# Patient Record
Sex: MALE | Race: WHITE
[De-identification: names, ages, dates, MRNs, and addresses within clinical notes are randomized per-mention and may not be internally consistent; named-entity substitution may affect disease eponyms.]

---

## 2024-12-02 ENCOUNTER — APPOINTMENT (OUTPATIENT)
Dept: VASCULAR SURGERY | Facility: CLINIC | Age: 79
End: 2024-12-02
Payer: MEDICARE

## 2024-12-02 DIAGNOSIS — Z99.2 END STAGE RENAL DISEASE: ICD-10-CM

## 2024-12-02 DIAGNOSIS — N18.6 END STAGE RENAL DISEASE: ICD-10-CM

## 2024-12-02 PROCEDURE — 99204 OFFICE O/P NEW MOD 45 MIN: CPT

## 2024-12-02 PROCEDURE — 93986 DUP-SCAN HEMO COMPL UNI STD: CPT

## 2024-12-02 RX ORDER — EMPAGLIFLOZIN 10 MG/1
10 TABLET, FILM COATED ORAL
Refills: 0 | Status: ACTIVE | COMMUNITY

## 2024-12-02 RX ORDER — PANTOPRAZOLE 20 MG/1
20 TABLET, DELAYED RELEASE ORAL
Refills: 0 | Status: ACTIVE | COMMUNITY

## 2024-12-02 RX ORDER — EZETIMIBE 10 MG/1
10 TABLET ORAL
Refills: 0 | Status: ACTIVE | COMMUNITY

## 2024-12-02 RX ORDER — FOLIC ACID/VIT B COMPLEX AND C 0.8 MG
TABLET ORAL
Refills: 0 | Status: ACTIVE | COMMUNITY

## 2024-12-02 RX ORDER — SACUBITRIL AND VALSARTAN 24; 26 MG/1; MG/1
24-26 TABLET, FILM COATED ORAL
Refills: 0 | Status: ACTIVE | COMMUNITY

## 2024-12-02 RX ORDER — APIXABAN 5 MG/1
5 TABLET, FILM COATED ORAL
Refills: 0 | Status: ACTIVE | COMMUNITY

## 2024-12-02 RX ORDER — ATORVASTATIN CALCIUM 80 MG/1
80 TABLET, FILM COATED ORAL
Refills: 0 | Status: ACTIVE | COMMUNITY

## 2024-12-02 RX ORDER — CARVEDILOL 6.25 MG/1
6.25 TABLET, FILM COATED ORAL
Refills: 0 | Status: ACTIVE | COMMUNITY

## 2024-12-09 PROBLEM — I10 ESSENTIAL (PRIMARY) HYPERTENSION: Chronic | Status: ACTIVE | Noted: 2024-12-05

## 2024-12-09 PROBLEM — I25.10 ATHEROSCLEROTIC HEART DISEASE OF NATIVE CORONARY ARTERY WITHOUT ANGINA PECTORIS: Chronic | Status: ACTIVE | Noted: 2024-12-05

## 2024-12-09 PROBLEM — C61 MALIGNANT NEOPLASM OF PROSTATE: Chronic | Status: ACTIVE | Noted: 2024-12-05

## 2024-12-09 PROBLEM — E78.5 HYPERLIPIDEMIA, UNSPECIFIED: Chronic | Status: ACTIVE | Noted: 2024-12-05

## 2024-12-09 PROBLEM — I73.9 PERIPHERAL VASCULAR DISEASE, UNSPECIFIED: Chronic | Status: ACTIVE | Noted: 2024-12-05

## 2024-12-09 PROBLEM — Z87.438 PERSONAL HISTORY OF OTHER DISEASES OF MALE GENITAL ORGANS: Chronic | Status: ACTIVE | Noted: 2024-12-05

## 2024-12-09 PROBLEM — E11.9 TYPE 2 DIABETES MELLITUS WITHOUT COMPLICATIONS: Chronic | Status: ACTIVE | Noted: 2024-12-05

## 2024-12-09 PROBLEM — I50.22 CHRONIC SYSTOLIC (CONGESTIVE) HEART FAILURE: Chronic | Status: ACTIVE | Noted: 2024-12-05

## 2025-01-09 VITALS
SYSTOLIC BLOOD PRESSURE: 167 MMHG | WEIGHT: 185.85 LBS | OXYGEN SATURATION: 96 % | RESPIRATION RATE: 16 BRPM | DIASTOLIC BLOOD PRESSURE: 83 MMHG | HEART RATE: 96 BPM | TEMPERATURE: 98 F | HEIGHT: 78 IN

## 2025-01-09 NOTE — ASU PREOP CHECKLIST - 2.
pt stated that he fell last week,, has bruising on left eye and wound on left forehead pt stated that he fell last week,, has bruising on left eye and wound on left forehead,and has not had HD since saturday- Dr. Quesada aware. pt surgery cancelled and will be sent to the ER for admission

## 2025-01-09 NOTE — ASU PATIENT PROFILE, ADULT - NSICDXPASTMEDICALHX_GEN_ALL_CORE_FT
PAST MEDICAL HISTORY:  CAD (coronary artery disease)     Chronic HFrEF (heart failure with reduced ejection fraction) 12/2023 - 30%    DM (diabetes mellitus)     ESRD on dialysis T, Thurs, Sat- permacath right chest    History of blood clotting disorder Undiagnosed- pt denies    History of BPH     History of recent fall left eye bruised    HLD (hyperlipidemia)     HTN (hypertension)     PAD (peripheral artery disease)     Prostate cancer Denies

## 2025-01-09 NOTE — ASU PATIENT PROFILE, ADULT - NSICDXPASTSURGICALHX_GEN_ALL_CORE_FT
PAST SURGICAL HISTORY:  Aneurysm of left popliteal artery Repair    Cataract bilateral    H/O extremity bypass graft RLE and LLE    S/P CABG x 4 2007

## 2025-01-10 ENCOUNTER — INPATIENT (INPATIENT)
Facility: HOSPITAL | Age: 80
LOS: 1 days | Discharge: HOME | End: 2025-01-12
Attending: INTERNAL MEDICINE | Admitting: INTERNAL MEDICINE
Payer: MEDICARE

## 2025-01-10 ENCOUNTER — OUTPATIENT (OUTPATIENT)
Dept: INPATIENT UNIT | Facility: HOSPITAL | Age: 80
LOS: 1 days | End: 2025-01-10
Payer: MEDICARE

## 2025-01-10 VITALS
HEIGHT: 78 IN | DIASTOLIC BLOOD PRESSURE: 92 MMHG | HEART RATE: 95 BPM | OXYGEN SATURATION: 97 % | SYSTOLIC BLOOD PRESSURE: 131 MMHG | TEMPERATURE: 98 F | RESPIRATION RATE: 16 BRPM

## 2025-01-10 DIAGNOSIS — Z29.9 ENCOUNTER FOR PROPHYLACTIC MEASURES, UNSPECIFIED: ICD-10-CM

## 2025-01-10 DIAGNOSIS — I72.4 ANEURYSM OF ARTERY OF LOWER EXTREMITY: Chronic | ICD-10-CM

## 2025-01-10 DIAGNOSIS — I50.22 CHRONIC SYSTOLIC (CONGESTIVE) HEART FAILURE: ICD-10-CM

## 2025-01-10 DIAGNOSIS — I73.9 PERIPHERAL VASCULAR DISEASE, UNSPECIFIED: ICD-10-CM

## 2025-01-10 DIAGNOSIS — Z86.2 PERSONAL HISTORY OF DISEASES OF THE BLOOD AND BLOOD-FORMING ORGANS AND CERTAIN DISORDERS INVOLVING THE IMMUNE MECHANISM: ICD-10-CM

## 2025-01-10 DIAGNOSIS — E87.5 HYPERKALEMIA: ICD-10-CM

## 2025-01-10 DIAGNOSIS — Z95.1 PRESENCE OF AORTOCORONARY BYPASS GRAFT: Chronic | ICD-10-CM

## 2025-01-10 DIAGNOSIS — I25.10 ATHEROSCLEROTIC HEART DISEASE OF NATIVE CORONARY ARTERY WITHOUT ANGINA PECTORIS: ICD-10-CM

## 2025-01-10 DIAGNOSIS — Z91.81 HISTORY OF FALLING: ICD-10-CM

## 2025-01-10 DIAGNOSIS — H26.9 UNSPECIFIED CATARACT: Chronic | ICD-10-CM

## 2025-01-10 DIAGNOSIS — N18.6 END STAGE RENAL DISEASE: ICD-10-CM

## 2025-01-10 DIAGNOSIS — Z95.828 PRESENCE OF OTHER VASCULAR IMPLANTS AND GRAFTS: Chronic | ICD-10-CM

## 2025-01-10 DIAGNOSIS — I10 ESSENTIAL (PRIMARY) HYPERTENSION: ICD-10-CM

## 2025-01-10 DIAGNOSIS — Z87.438 PERSONAL HISTORY OF OTHER DISEASES OF MALE GENITAL ORGANS: ICD-10-CM

## 2025-01-10 DIAGNOSIS — E11.9 TYPE 2 DIABETES MELLITUS WITHOUT COMPLICATIONS: ICD-10-CM

## 2025-01-10 DIAGNOSIS — K83.8 OTHER SPECIFIED DISEASES OF BILIARY TRACT: ICD-10-CM

## 2025-01-10 LAB
ADD ON TEST-SPECIMEN IN LAB: SIGNIFICANT CHANGE UP
ADD ON TEST-SPECIMEN IN LAB: SIGNIFICANT CHANGE UP
ALBUMIN SERPL ELPH-MCNC: 3.9 G/DL — SIGNIFICANT CHANGE UP (ref 3.3–5)
ALP SERPL-CCNC: 108 U/L — SIGNIFICANT CHANGE UP (ref 40–120)
ALT FLD-CCNC: 58 U/L — HIGH (ref 10–45)
ANION GAP SERPL CALC-SCNC: 15 MMOL/L — SIGNIFICANT CHANGE UP (ref 5–17)
ANION GAP SERPL CALC-SCNC: 25 MMOL/L — HIGH (ref 5–17)
APTT BLD: 48.6 SEC — HIGH (ref 24.5–35.6)
AST SERPL-CCNC: 25 U/L — SIGNIFICANT CHANGE UP (ref 10–40)
BASOPHILS # BLD AUTO: 0.06 K/UL — SIGNIFICANT CHANGE UP (ref 0–0.2)
BASOPHILS NFR BLD AUTO: 0.6 % — SIGNIFICANT CHANGE UP (ref 0–2)
BILIRUB SERPL-MCNC: 0.3 MG/DL — SIGNIFICANT CHANGE UP (ref 0.2–1.2)
BUN SERPL-MCNC: 27 MG/DL — HIGH (ref 7–23)
BUN SERPL-MCNC: 97 MG/DL — HIGH (ref 7–23)
CALCIUM SERPL-MCNC: 8.2 MG/DL — LOW (ref 8.4–10.5)
CALCIUM SERPL-MCNC: 8.6 MG/DL — SIGNIFICANT CHANGE UP (ref 8.4–10.5)
CHLORIDE SERPL-SCNC: 100 MMOL/L — SIGNIFICANT CHANGE UP (ref 96–108)
CHLORIDE SERPL-SCNC: 96 MMOL/L — SIGNIFICANT CHANGE UP (ref 96–108)
CO2 SERPL-SCNC: 12 MMOL/L — LOW (ref 22–31)
CO2 SERPL-SCNC: 29 MMOL/L — SIGNIFICANT CHANGE UP (ref 22–31)
CREAT SERPL-MCNC: 11.78 MG/DL — HIGH (ref 0.5–1.3)
CREAT SERPL-MCNC: 3.68 MG/DL — HIGH (ref 0.5–1.3)
EGFR: 16 ML/MIN/1.73M2 — LOW
EGFR: 4 ML/MIN/1.73M2 — LOW
EOSINOPHIL # BLD AUTO: 0.17 K/UL — SIGNIFICANT CHANGE UP (ref 0–0.5)
EOSINOPHIL NFR BLD AUTO: 1.6 % — SIGNIFICANT CHANGE UP (ref 0–6)
FLUAV AG NPH QL: SIGNIFICANT CHANGE UP
FLUBV AG NPH QL: SIGNIFICANT CHANGE UP
GAS PNL BLDV: SIGNIFICANT CHANGE UP
GAS PNL BLDV: SIGNIFICANT CHANGE UP
GLUCOSE SERPL-MCNC: 78 MG/DL — SIGNIFICANT CHANGE UP (ref 70–99)
GLUCOSE SERPL-MCNC: 99 MG/DL — SIGNIFICANT CHANGE UP (ref 70–99)
HBV SURFACE AB SER-ACNC: REACTIVE — SIGNIFICANT CHANGE UP
HBV SURFACE AG SER-ACNC: SIGNIFICANT CHANGE UP
HCT VFR BLD CALC: 27 % — LOW (ref 39–50)
HCV AB S/CO SERPL IA: 0.04 S/CO — SIGNIFICANT CHANGE UP
HCV AB SERPL-IMP: SIGNIFICANT CHANGE UP
HGB BLD-MCNC: 8.2 G/DL — LOW (ref 13–17)
IMM GRANULOCYTES NFR BLD AUTO: 0.9 % — SIGNIFICANT CHANGE UP (ref 0–0.9)
INR BLD: 1.08 — SIGNIFICANT CHANGE UP (ref 0.85–1.16)
LACTATE SERPL-SCNC: 1 MMOL/L — SIGNIFICANT CHANGE UP (ref 0.5–2)
LYMPHOCYTES # BLD AUTO: 1.3 K/UL — SIGNIFICANT CHANGE UP (ref 1–3.3)
LYMPHOCYTES # BLD AUTO: 12.5 % — LOW (ref 13–44)
MAGNESIUM SERPL-MCNC: 1.6 MG/DL — SIGNIFICANT CHANGE UP (ref 1.6–2.6)
MCHC RBC-ENTMCNC: 29.2 PG — SIGNIFICANT CHANGE UP (ref 27–34)
MCHC RBC-ENTMCNC: 30.4 G/DL — LOW (ref 32–36)
MCV RBC AUTO: 96.1 FL — SIGNIFICANT CHANGE UP (ref 80–100)
MONOCYTES # BLD AUTO: 0.19 K/UL — SIGNIFICANT CHANGE UP (ref 0–0.9)
MONOCYTES NFR BLD AUTO: 1.8 % — LOW (ref 2–14)
NEUTROPHILS # BLD AUTO: 8.61 K/UL — HIGH (ref 1.8–7.4)
NEUTROPHILS NFR BLD AUTO: 82.6 % — HIGH (ref 43–77)
NRBC # BLD: 0 /100 WBCS — SIGNIFICANT CHANGE UP (ref 0–0)
PHOSPHATE SERPL-MCNC: 9.4 MG/DL — HIGH (ref 2.5–4.5)
PLATELET # BLD AUTO: 189 K/UL — SIGNIFICANT CHANGE UP (ref 150–400)
POTASSIUM SERPL-MCNC: 3.2 MMOL/L — LOW (ref 3.5–5.3)
POTASSIUM SERPL-MCNC: 6.3 MMOL/L — CRITICAL HIGH (ref 3.5–5.3)
POTASSIUM SERPL-SCNC: 3.2 MMOL/L — LOW (ref 3.5–5.3)
POTASSIUM SERPL-SCNC: 6.3 MMOL/L — CRITICAL HIGH (ref 3.5–5.3)
PROT SERPL-MCNC: 7.2 G/DL — SIGNIFICANT CHANGE UP (ref 6–8.3)
PROTHROM AB SERPL-ACNC: 12.6 SEC — SIGNIFICANT CHANGE UP (ref 9.9–13.4)
RBC # BLD: 2.81 M/UL — LOW (ref 4.2–5.8)
RBC # FLD: 16.7 % — HIGH (ref 10.3–14.5)
RSV RNA NPH QL NAA+NON-PROBE: SIGNIFICANT CHANGE UP
SARS-COV-2 RNA SPEC QL NAA+PROBE: SIGNIFICANT CHANGE UP
SODIUM SERPL-SCNC: 137 MMOL/L — SIGNIFICANT CHANGE UP (ref 135–145)
SODIUM SERPL-SCNC: 140 MMOL/L — SIGNIFICANT CHANGE UP (ref 135–145)
WBC # BLD: 10.42 K/UL — SIGNIFICANT CHANGE UP (ref 3.8–10.5)
WBC # FLD AUTO: 10.42 K/UL — SIGNIFICANT CHANGE UP (ref 3.8–10.5)

## 2025-01-10 PROCEDURE — 70450 CT HEAD/BRAIN W/O DYE: CPT | Mod: 26

## 2025-01-10 PROCEDURE — 36415 COLL VENOUS BLD VENIPUNCTURE: CPT

## 2025-01-10 PROCEDURE — 99291 CRITICAL CARE FIRST HOUR: CPT

## 2025-01-10 PROCEDURE — 84295 ASSAY OF SERUM SODIUM: CPT

## 2025-01-10 PROCEDURE — 85610 PROTHROMBIN TIME: CPT

## 2025-01-10 PROCEDURE — 85730 THROMBOPLASTIN TIME PARTIAL: CPT

## 2025-01-10 PROCEDURE — 82330 ASSAY OF CALCIUM: CPT

## 2025-01-10 PROCEDURE — 93010 ELECTROCARDIOGRAM REPORT: CPT

## 2025-01-10 PROCEDURE — 84132 ASSAY OF SERUM POTASSIUM: CPT

## 2025-01-10 PROCEDURE — 82947 ASSAY GLUCOSE BLOOD QUANT: CPT

## 2025-01-10 PROCEDURE — 71045 X-RAY EXAM CHEST 1 VIEW: CPT | Mod: 26,XE

## 2025-01-10 PROCEDURE — 82962 GLUCOSE BLOOD TEST: CPT

## 2025-01-10 PROCEDURE — 80053 COMPREHEN METABOLIC PANEL: CPT

## 2025-01-10 PROCEDURE — 85014 HEMATOCRIT: CPT

## 2025-01-10 PROCEDURE — 99223 1ST HOSP IP/OBS HIGH 75: CPT | Mod: GC

## 2025-01-10 PROCEDURE — 71046 X-RAY EXAM CHEST 2 VIEWS: CPT | Mod: 26

## 2025-01-10 PROCEDURE — 82803 BLOOD GASES ANY COMBINATION: CPT

## 2025-01-10 RX ORDER — CEFTRIAXONE SODIUM 1 G/1
1000 INJECTION, POWDER, FOR SOLUTION INTRAMUSCULAR; INTRAVENOUS ONCE
Refills: 0 | Status: COMPLETED | OUTPATIENT
Start: 2025-01-10 | End: 2025-01-10

## 2025-01-10 RX ORDER — PANTOPRAZOLE 40 MG/1
40 TABLET, DELAYED RELEASE ORAL
Refills: 0 | Status: DISCONTINUED | OUTPATIENT
Start: 2025-01-10 | End: 2025-01-10

## 2025-01-10 RX ORDER — CHLORHEXIDINE GLUCONATE 1.2 MG/ML
1 RINSE ORAL ONCE
Refills: 0 | Status: COMPLETED | OUTPATIENT
Start: 2025-01-10 | End: 2025-01-10

## 2025-01-10 RX ORDER — ATORVASTATIN CALCIUM 40 MG/1
80 TABLET, FILM COATED ORAL AT BEDTIME
Refills: 0 | Status: DISCONTINUED | OUTPATIENT
Start: 2025-01-10 | End: 2025-01-12

## 2025-01-10 RX ORDER — CALCIUM GLUCONATE 94 MG/ML
1 INJECTION, SOLUTION INTRAVENOUS ONCE
Refills: 0 | Status: COMPLETED | OUTPATIENT
Start: 2025-01-10 | End: 2025-01-10

## 2025-01-10 RX ORDER — ASPIRIN 81 MG
81 TABLET, DELAYED RELEASE (ENTERIC COATED) ORAL DAILY
Refills: 0 | Status: DISCONTINUED | OUTPATIENT
Start: 2025-01-10 | End: 2025-01-10

## 2025-01-10 RX ORDER — APIXABAN 5 MG/1
5 TABLET, FILM COATED ORAL EVERY 12 HOURS
Refills: 0 | Status: DISCONTINUED | OUTPATIENT
Start: 2025-01-10 | End: 2025-01-12

## 2025-01-10 RX ORDER — PANTOPRAZOLE 40 MG/1
1 TABLET, DELAYED RELEASE ORAL
Refills: 0 | DISCHARGE

## 2025-01-10 RX ORDER — APIXABAN 5 MG/1
5 TABLET, FILM COATED ORAL ONCE
Refills: 0 | Status: COMPLETED | OUTPATIENT
Start: 2025-01-10 | End: 2025-01-10

## 2025-01-10 RX ORDER — AZITHROMYCIN MONOHYDRATE 200 MG/5ML
500 POWDER, FOR SUSPENSION ORAL ONCE
Refills: 0 | Status: COMPLETED | OUTPATIENT
Start: 2025-01-10 | End: 2025-01-10

## 2025-01-10 RX ORDER — AZITHROMYCIN MONOHYDRATE 200 MG/5ML
500 POWDER, FOR SUSPENSION ORAL EVERY 24 HOURS
Refills: 0 | Status: DISCONTINUED | OUTPATIENT
Start: 2025-01-11 | End: 2025-01-12

## 2025-01-10 RX ORDER — PANTOPRAZOLE 40 MG/1
20 TABLET, DELAYED RELEASE ORAL
Refills: 0 | Status: DISCONTINUED | OUTPATIENT
Start: 2025-01-10 | End: 2025-01-12

## 2025-01-10 RX ORDER — CEFTRIAXONE SODIUM 1 G/1
1000 INJECTION, POWDER, FOR SOLUTION INTRAMUSCULAR; INTRAVENOUS EVERY 24 HOURS
Refills: 0 | Status: DISCONTINUED | OUTPATIENT
Start: 2025-01-11 | End: 2025-01-12

## 2025-01-10 RX ORDER — SODIUM ZIRCONIUM CYCLOSILICATE 10 G/10G
10 POWDER, FOR SUSPENSION ORAL ONCE
Refills: 0 | Status: COMPLETED | OUTPATIENT
Start: 2025-01-10 | End: 2025-01-10

## 2025-01-10 RX ORDER — EPOETIN ALFA 2000 [IU]/ML
8000 SOLUTION INTRAVENOUS; SUBCUTANEOUS ONCE
Refills: 0 | Status: COMPLETED | OUTPATIENT
Start: 2025-01-10 | End: 2025-01-10

## 2025-01-10 RX ORDER — INSULIN HUMAN 100 [IU]/ML
5 INJECTION, SOLUTION SUBCUTANEOUS ONCE
Refills: 0 | Status: COMPLETED | OUTPATIENT
Start: 2025-01-10 | End: 2025-01-10

## 2025-01-10 RX ORDER — ALBUTEROL SULFATE 90 UG/1
2.5 INHALANT RESPIRATORY (INHALATION) ONCE
Refills: 0 | Status: COMPLETED | OUTPATIENT
Start: 2025-01-10 | End: 2025-01-10

## 2025-01-10 RX ORDER — EPOETIN ALFA 2000 [IU]/ML
8000 SOLUTION INTRAVENOUS; SUBCUTANEOUS ONCE
Refills: 0 | Status: DISCONTINUED | OUTPATIENT
Start: 2025-01-10 | End: 2025-01-10

## 2025-01-10 RX ORDER — ASPIRIN 81 MG
81 TABLET, DELAYED RELEASE (ENTERIC COATED) ORAL DAILY
Refills: 0 | Status: DISCONTINUED | OUTPATIENT
Start: 2025-01-10 | End: 2025-01-12

## 2025-01-10 RX ORDER — EZETIMIBE 10 MG/1
10 TABLET ORAL DAILY
Refills: 0 | Status: DISCONTINUED | OUTPATIENT
Start: 2025-01-10 | End: 2025-01-10

## 2025-01-10 RX ORDER — DEXTROSE MONOHYDRATE 25 G/50ML
50 INJECTION, SOLUTION INTRAVENOUS ONCE
Refills: 0 | Status: COMPLETED | OUTPATIENT
Start: 2025-01-10 | End: 2025-01-10

## 2025-01-10 RX ORDER — ATORVASTATIN CALCIUM 40 MG/1
80 TABLET, FILM COATED ORAL AT BEDTIME
Refills: 0 | Status: DISCONTINUED | OUTPATIENT
Start: 2025-01-10 | End: 2025-01-10

## 2025-01-10 RX ORDER — CARVEDILOL 25 MG/1
6.25 TABLET, FILM COATED ORAL EVERY 12 HOURS
Refills: 0 | Status: DISCONTINUED | OUTPATIENT
Start: 2025-01-10 | End: 2025-01-10

## 2025-01-10 RX ADMIN — Medication 81 MILLIGRAM(S): at 19:19

## 2025-01-10 RX ADMIN — ALBUTEROL SULFATE 2.5 MILLIGRAM(S): 90 INHALANT RESPIRATORY (INHALATION) at 09:30

## 2025-01-10 RX ADMIN — CEFTRIAXONE SODIUM 100 MILLIGRAM(S): 1 INJECTION, POWDER, FOR SOLUTION INTRAMUSCULAR; INTRAVENOUS at 09:54

## 2025-01-10 RX ADMIN — CALCIUM GLUCONATE 100 GRAM(S): 94 INJECTION, SOLUTION INTRAVENOUS at 09:30

## 2025-01-10 RX ADMIN — INSULIN HUMAN 5 UNIT(S): 100 INJECTION, SOLUTION SUBCUTANEOUS at 09:30

## 2025-01-10 RX ADMIN — DEXTROSE MONOHYDRATE 50 MILLILITER(S): 25 INJECTION, SOLUTION INTRAVENOUS at 09:30

## 2025-01-10 RX ADMIN — ATORVASTATIN CALCIUM 80 MILLIGRAM(S): 40 TABLET, FILM COATED ORAL at 21:47

## 2025-01-10 RX ADMIN — APIXABAN 5 MILLIGRAM(S): 5 TABLET, FILM COATED ORAL at 19:19

## 2025-01-10 RX ADMIN — SODIUM ZIRCONIUM CYCLOSILICATE 10 GRAM(S): 10 POWDER, FOR SUSPENSION ORAL at 07:44

## 2025-01-10 RX ADMIN — EPOETIN ALFA 8000 UNIT(S): 2000 SOLUTION INTRAVENOUS; SUBCUTANEOUS at 16:10

## 2025-01-10 RX ADMIN — AZITHROMYCIN MONOHYDRATE 255 MILLIGRAM(S): 200 POWDER, FOR SUSPENSION ORAL at 09:55

## 2025-01-10 RX ADMIN — APIXABAN 5 MILLIGRAM(S): 5 TABLET, FILM COATED ORAL at 10:46

## 2025-01-10 NOTE — H&P ADULT - PROBLEM SELECTOR PLAN 2
Patient with dry cough and sob. Found to have Right lower lobe consolidation. S/p CTX and Azithromycin in ED.  - c/w CTX and Azithromycin  - urine step and legionella Patient with dry cough and sob. Found to have Right lower lobe consolidation. S/p CTX and Azithromycin in ED.  - c/w CTX and Azithromycin  - urine step and legionella  - f/u full RVP

## 2025-01-10 NOTE — CONSULT NOTE ADULT - SUBJECTIVE AND OBJECTIVE BOX
NEPHROLOGY SERVICE INITIAL CONSULT NOTE    HPI: 78 y/o M  w/ ESRD TTS scheduled via Right chest TDC, Missed  dialysis x2, last dialyzed 25).  Missed session because of fall coming off the bus and sustained head injury, no LOC, h/o HTN,  CAD s/p CABG() with multiple PCI, PAD, came e    	Mr. Oliverio Frausto is a 79M (right hand dominant) w/ HTN, HLD, CAD s/p CABG () and multiple PCI (), CHF (EF 30%) and PAD s/p L popliteal aneurysm repair c/b compartment syndrome s/p fasciotomy and subsequent LLE bypass (3/2019), also prior RLE bypass, BPH, possible hypercoagulable disorder, prior hospitalization in Fall  for pneumonia (since recovered), and ESRD on HD s/p RIJ permcath who was scheduled for LUE AVF/AVG creation today 1/10/25. However he had a fall last Saturday and has subsequently missed dialysis this past week. Potassium 6.7 on iSTAT this morning. has some tachypnea. Has some ecchymosis on his face after recent fall.     Will cancel today's case and send him to the ED for medical admission. Consult renal for dialysis today. CXR. Obtain CTH for recent fall. If no issues, restart Eliquis today. Likely would DC after dialysis today (or if needed tomorrow)    Will tentatively reschedule him for outpatient LUE AVF/AVG creation next 25. Instructed him to resume Eliquis and take last dose night of 25. Will try to arrange with his outpatient dialysis center for dialysis on 25 in preparation for Tuesday's procedure. If too difficult to coordinate will reschedule for another day.        REVIEW OF SYSTEMS: Otherwise negative except as specified in HPI  PAST MEDICAL & SURGICAL HISTORY:  HTN (hypertension)      HLD (hyperlipidemia)      CAD (coronary artery disease)    1/  DM (diabetes mellitus)      Chronic HFrEF (heart failure with reduced ejection fraction)  2023 - 30%      PAD (peripheral artery disease)      History of BPH      ESRD on dialysis  T, Thurs, Sat- permacath right chest      History of blood clotting disorder  Undiagnosed- pt denies      Prostate cancer  Denies      History of recent fall  left eye bruised      S/P CABG x 4        Aneurysm of left popliteal artery  Repair      H/O extremity bypass graft  RLE and LLE      Cataract  bilateral        FAMILY HISTORY:    SOCIAL HISTORY:  HOME MEDICATIONS:    Allergies    No Known Allergies    Intolerances        ACTIVE MEDICATIONS:  MEDICATIONS  (STANDING):  aspirin enteric coated 81 milliGRAM(s) Oral daily  atorvastatin 80 milliGRAM(s) Oral at bedtime  carvedilol 6.25 milliGRAM(s) Oral every 12 hours  ezetimibe 10 milliGRAM(s) Oral daily  pantoprazole    Tablet 40 milliGRAM(s) Oral before breakfast    MEDICATIONS  (PRN):        VITAL SIGNS:  Vital Signs Last 24 Hrs  T(C): 36.6 (10 Mir 2025 07:59), Max: 36.8 (2025 11:43)  T(F): 97.9 (10 Mir 2025 07:59), Max: 98.3 (2025 11:43)  HR: 95 (10 Mir 2025 07:59) (95 - 96)  BP: 131/92 (10 Mir 2025 07:59) (131/92 - 167/83)  BP(mean): --  RR: 16 (10 Mir 2025 07:59) (16 - 16)  SpO2: 97% (10 Mir 2025 07:59) (96% - 97%)          PE:  General: Not in acute distress, well-nourished  Neck: No visible mass, No JVD noted  Chest: CTAP b/l, no use of accessory respiratory muscles  Heart: RRR, S1/S2 wnl, no MRG  Abdomen: Soft, nontender, nondistended,  no hepatosplenomegaly  Extremities: No clubbing, cyanosis or edema  Neuro:  Alert, no apparent focal deficits, answer questions appropriately  Access:    Pertinent labs & Imagin.2    10.42 )-----------( 189      ( 10 Mir 2025 08:22 )             27.0     01-10    x   |  x   |  x   ----------------------------<  78  x    |  x   |  x     Ca    8.6      10 Mir 2025 06:59  Mg     1.6     01-10    TPro  7.9  /  Alb  4.3  /  TBili  0.4  /  DBili  x   /  AST  28  /  ALT  62[H]  /  AlkPhos  122[H]  01-10    PT/INR - ( 10 Mir 2025 08:24 )   PT: 12.6 sec;   INR: 1.08          PTT - ( 10 Mir 2025 08:24 )  PTT:48.6 sec  Urinalysis Basic - ( 10 Mir 2025 08:22 )    Color: x / Appearance: x / SG: x / pH: x  Gluc: 78 mg/dL / Ketone: x  / Bili: x / Urobili: x   Blood: x / Protein: x / Nitrite: x   Leuk Esterase: x / RBC: x / WBC x   Sq Epi: x / Non Sq Epi: x / Bacteria: x          CAPILLARY BLOOD GLUCOSE      POCT Blood Glucose.: 80 mg/dL (10 Mir 2025 06:48)          RADIOLOGY & ADDITIONAL TESTS: Reviewed. NEPHROLOGY SERVICE INITIAL CONSULT NOTE    HPI: 80 y/o M  w/ ESRD TTS scheduled via Right chest TDC, Missed  dialysis x2, last dialyzed 25).  Missed session because of fall coming off the bus and sustained head injury, no LOC, h/o HTN, HFrEF (30%) CAD s/p CABG() with multiple PCI, PAD w/ multiple interventions on eliquis,  here for AVF creation, found to be hyperkalemic K 6.5, given lokelma 10g , no EKG changes, nephrology consulted for HD management    REVIEW OF SYSTEMS: Otherwise negative except as specified in HPI  PAST MEDICAL & SURGICAL HISTORY:  HTN (hypertension)      HLD (hyperlipidemia)      CAD (coronary artery disease)    1/  DM (diabetes mellitus)      Chronic HFrEF (heart failure with reduced ejection fraction)  2023 - 30%      PAD (peripheral artery disease)      History of BPH      ESRD on dialysis  T, Thurs, Sat- permacath right chest      History of blood clotting disorder  Undiagnosed- pt denies      Prostate cancer  Denies      History of recent fall  left eye bruised      S/P CABG x 4        Aneurysm of left popliteal artery  Repair      H/O extremity bypass graft  RLE and LLE      Cataract  bilateral        FAMILY HISTORY:    SOCIAL HISTORY:  HOME MEDICATIONS:    Allergies    No Known Allergies    Intolerances        ACTIVE MEDICATIONS:  MEDICATIONS  (STANDING):  aspirin enteric coated 81 milliGRAM(s) Oral daily  atorvastatin 80 milliGRAM(s) Oral at bedtime  carvedilol 6.25 milliGRAM(s) Oral every 12 hours  ezetimibe 10 milliGRAM(s) Oral daily  pantoprazole    Tablet 40 milliGRAM(s) Oral before breakfast    MEDICATIONS  (PRN):        VITAL SIGNS:  Vital Signs Last 24 Hrs  T(C): 36.6 (10 Mir 2025 07:59), Max: 36.8 (2025 11:43)  T(F): 97.9 (10 Mir 2025 07:59), Max: 98.3 (2025 11:43)  HR: 95 (10 Mir 2025 07:59) (95 - 96)  BP: 131/92 (10 Mir 2025 07:59) (131/92 - 167/83)  BP(mean): --  RR: 16 (10 Mir 2025 07:59) (16 - 16)  SpO2: 97% (10 Mir 2025 07:59) (96% - 97%)          PE:  General: Not in acute distress, well-nourished  Neck: No visible mass, No JVD noted  Chest: CTAP b/l, no use of accessory respiratory muscles  Heart: RRR, S1/S2 wnl, no MRG  Abdomen: Soft, nontender, nondistended,  no hepatosplenomegaly  Extremities: No clubbing, cyanosis or edema  Neuro:  Alert, no apparent focal deficits, answer questions appropriately  Access:    Pertinent labs & Imagin.2    10.42 )-----------( 189      ( 10 Mir 2025 08:22 )             27.0     01-10    x   |  x   |  x   ----------------------------<  78  x    |  x   |  x     Ca    8.6      10 Mir 2025 06:59  Mg     1.6     01-10    TPro  7.9  /  Alb  4.3  /  TBili  0.4  /  DBili  x   /  AST  28  /  ALT  62[H]  /  AlkPhos  122[H]  01-10    PT/INR - ( 10 Mir 2025 08:24 )   PT: 12.6 sec;   INR: 1.08          PTT - ( 10 Mir 2025 08:24 )  PTT:48.6 sec  Urinalysis Basic - ( 10 Mir 2025 08:22 )    Color: x / Appearance: x / SG: x / pH: x  Gluc: 78 mg/dL / Ketone: x  / Bili: x / Urobili: x   Blood: x / Protein: x / Nitrite: x   Leuk Esterase: x / RBC: x / WBC x   Sq Epi: x / Non Sq Epi: x / Bacteria: x          CAPILLARY BLOOD GLUCOSE      POCT Blood Glucose.: 80 mg/dL (10 Mir 2025 06:48)          RADIOLOGY & ADDITIONAL TESTS: Reviewed. NEPHROLOGY SERVICE INITIAL CONSULT NOTE    HPI: 80 y/o M  w/ ESRD TTS scheduled via Right chest TDC, Missed  dialysis x2, last dialyzed 25).  Missed session because of a fall coming off the bus and sustained head injury, no LOC, h/o HTN, HFrEF (30%) CAD s/p CABG() with multiple PCI, PAD w/ multiple interventions on eliquis,  here for AVF creation, found to be hyperkalemic K 6.5, given lokelma 10g , no EKG changes procedure postponed, nephrology consulted for HD management.    REVIEW OF SYSTEMS: Otherwise negative except as specified in HPI  PAST MEDICAL & SURGICAL HISTORY:  HTN (hypertension)      HLD (hyperlipidemia)      CAD (coronary artery disease)    1/  DM (diabetes mellitus)      Chronic HFrEF (heart failure with reduced ejection fraction)  2023 - 30%      PAD (peripheral artery disease)      History of BPH      ESRD on dialysis  T, Thurs, Sat- permacath right chest      History of blood clotting disorder  Undiagnosed- pt denies      Prostate cancer  Denies      History of recent fall  left eye bruised      S/P CABG x 4        Aneurysm of left popliteal artery  Repair      H/O extremity bypass graft  RLE and LLE      Cataract  bilateral        FAMILY HISTORY:    SOCIAL HISTORY:  HOME MEDICATIONS:    Allergies    No Known Allergies    Intolerances        ACTIVE MEDICATIONS:  MEDICATIONS  (STANDING):  aspirin enteric coated 81 milliGRAM(s) Oral daily  atorvastatin 80 milliGRAM(s) Oral at bedtime  carvedilol 6.25 milliGRAM(s) Oral every 12 hours  ezetimibe 10 milliGRAM(s) Oral daily  pantoprazole    Tablet 40 milliGRAM(s) Oral before breakfast    MEDICATIONS  (PRN):        VITAL SIGNS:  Vital Signs Last 24 Hrs  T(C): 36.6 (10 Mir 2025 07:59), Max: 36.8 (2025 11:43)  T(F): 97.9 (10 Mir 2025 07:59), Max: 98.3 (2025 11:43)  HR: 95 (10 Mir 2025 07:59) (95 - 96)  BP: 131/92 (10 Mir 2025 07:59) (131/92 - 167/83)  BP(mean): --  RR: 16 (10 Mir 2025 07:59) (16 - 16)  SpO2: 97% (10 Mir 2025 07:59) (96% - 97%)          PE:  General: mild respiratory distress , well-nourished  Neck: No visible mass, No JVD noted  Chest: CTAP b/l, no use of accessory respiratory muscles  Heart: RRR, S1/S2 wnl, no MRG  Abdomen: Soft, nontender, nondistended,  no hepatosplenomegaly  Extremities: No clubbing, cyanosis or edema  Neuro:  Alert, no apparent focal deficits, answer questions appropriately  Access: Right chest TDSC c/d/i    Pertinent labs & Imagin.2    10.42 )-----------( 189      ( 10 Mir 2025 08:22 )             27.0     01-10    x   |  x   |  x   ----------------------------<  78  x    |  x   |  x     Ca    8.6      10 Mir 2025 06:59  Mg     1.6     01-10    TPro  7.9  /  Alb  4.3  /  TBili  0.4  /  DBili  x   /  AST  28  /  ALT  62[H]  /  AlkPhos  122[H]  01-10    PT/INR - ( 10 Mir 2025 08:24 )   PT: 12.6 sec;   INR: 1.08          PTT - ( 10 Mir 2025 08:24 )  PTT:48.6 sec  Urinalysis Basic - ( 10 Mir 2025 08:22 )    Color: x / Appearance: x / SG: x / pH: x  Gluc: 78 mg/dL / Ketone: x  / Bili: x / Urobili: x   Blood: x / Protein: x / Nitrite: x   Leuk Esterase: x / RBC: x / WBC x   Sq Epi: x / Non Sq Epi: x / Bacteria: x          CAPILLARY BLOOD GLUCOSE      POCT Blood Glucose.: 80 mg/dL (10 Mir 2025 06:48)          RADIOLOGY & ADDITIONAL TESTS: Reviewed.

## 2025-01-10 NOTE — ED PROVIDER NOTE - CADM POA PRESS ULCER
[TextEntry] : PHYSICAL EXAM   General: The patient was alert, oriented and in no distress. Voice was clear.   Face: The patient had no facial asymmetry or mass. The skin was unremarkable.   Ears: Hearing normal to conversational voice External ears were normal without deformity. Ear canals : scant cerumen was removed atraumatically with a curette under the microscope. dry skin. no inflammation Tympanic membranes were intact and normal. No perforation or effusion. mobile   Nose: The external nose had no significant deformity.  There was no facial tenderness.  On anterior rhinoscopy, the nasal mucosa was normal.  The anterior septum was grossly midline. There were no visualized polyps, purulence or masses.   Oral cavity: Oral mucosa- normal. Oral and base of tongue- clear and without mass. Gingival and buccal mucosa- moist and without lesions. Palate- the palate moved well. There was no cleft palate. There appeared to be good salivary flow.  Oral cavity/oropharynx- no pus, erythema or mass   Neck: The neck was symmetrical. The parotid and submandibular glands were normal without masses. The trachea was midline and there was no unusual crepitus. Thyroid was smooth and nontender and no masses were palpated. No masses   Lymphatics: Cervical adenopathy- none.  No

## 2025-01-10 NOTE — CONSULT NOTE ADULT - ASSESSMENT
78 y/o M  w/ ESRD TTS scheduled via Right chest TDC, Missed  dialysis x2, last dialyzed Saturday 1/4/25).  Missed session because of fall coming off the bus and sustained head injury, no LOC, h/o HTN, HFrEF (30%) CAD s/p CABG(2007) with multiple PCI, PAD w/ multiple interventions on eliquis,  here for AVF creation, found to be hyperkalemic K 6.5, given lokelma 10g , no EKG changes, nephrology consulted for HD management    80 y/o M  w/ ESRD TTS scheduled via Right chest TDC, Missed  dialysis x2, last). , h/o HTN, HFrEF (30%) CAD s/p CABG() with multiple PCI, PAD w/ multiple interventions on eliquis,  here for AVF creation, found to be hyperkalemic K 6.5, given lokelma 10g , no EKG changes, nephrology consulted for HD management.    ESRD with hyperkalemia due to missed HD    -For HD with below parameters  Hemodialysis Treatment.:     Schedule: Once, Modality: Hemodialysis, Access: Internal Jugular Central Venous Catheter    Dialyzer: Optiflux [id number], Time: 210 Min    Blood Flow: 400 mL/Min , Dialysate Flow: 500 mL/Min, Dialysate Temp: 36.5, Tubinmm (Adult)    Target Fluid Removal: 2 Liters    Dialysate Electrolytes (mEq/L): Potassium 2, Calcium 2.5, Sodium 138, Bicarbonate 35 (01-10-25 @ 09:04) [Active]  -Renal diet  -Daily weight to help determine EDW    HTN/Volume status: slightly volume up  -UF with dialysis  -Hold BP meds before each dialysis session, can resume after HD    CKD-Anemia  -For iron studies, ferritin level  -Will adinister HODAN with each HD session    CKD-MBS: Hyperphosphatemia and hypocalcemia , likely secondary hyperparathyroidism4  Check iPTH level  sevelamer 800 mg tid  Will follow

## 2025-01-10 NOTE — H&P ADULT - PROBLEM SELECTOR PLAN 5
PMHx CAD s/p CABG x4 (2007), PCI to OM1 2/22. Home med: aspirin, atorvastatin 80mg, ezetimibe.  - c/w home meds ESRD on dialysis TThSat. Has R chest permacath, planning for L AV fistula.   - Urgent HD today for hyperkalemia  - Plan for dialysis tomorrow   - outpatient rescheduling with vascular surgery for AVF of L arm   - left arm limb precautions  - f/u nephro recs

## 2025-01-10 NOTE — H&P ADULT - ASSESSMENT
80 yo M with PMHx CAD s/p CABG x4 (2007), PCI to OM1 2/22, HFrEF (12/23 EF 30%), PAD s/p multiple peripheral interventions with L pop aneurysm repair c/b compartment syndrome with fasciotomy and subsequent LLE bypass (3/19), RLE bypass (4/20), likely undiagnosed clotting disorder on eliquis, ESRD on HD TTS via perm cath R chest, HTN, DM, BPH who presents for hyperkalemia during preop for elective L AVF placement with vascular surgery,  admitted to medicine for further management.

## 2025-01-10 NOTE — H&P ADULT - HISTORY OF PRESENT ILLNESS
80 yo M with PMHx CAD s/p CABG x4 (2007), PCI to OM1 2/22, HFrEF (12/23 EF 30%), PAD s/p multiple peripheral interventions with L pop aneurysm repair c/b compartment syndrome with fasciotomy and subsequent LLE bypass (3/19), RLE bypass (4/20), likely undiagnosed clotting disorder on eliquis, ESRD on HD TTS via perm cath R chest, HTN, DM, BPH who presents for hyperkalemia during preop for elective L AVF placement with vascular surgery. Patient reports missing dialysis 2x this past week due to the cold weather and is reliant on public transportaion. His last HD session was last Saturday. He also rports mechanical fall on Saturday after HD while getting off bus andh hit his head. Also reports cough and SOB 78 yo M with PMHx CAD s/p CABG x4 (2007), PCI to OM1 2/22, HFrEF (12/23 EF 30%), PAD s/p multiple peripheral interventions with L pop aneurysm repair c/b compartment syndrome with fasciotomy and subsequent LLE bypass (3/19), RLE bypass (4/20), likely undiagnosed clotting disorder on eliquis, ESRD on HD TTS via perm cath R chest, HTN, DM, BPH who presents for hyperkalemia during preop for elective L AVF placement with vascular surgery. Patient reports missing dialysis 2x this past week due to the cold weather and is reliant on public transportation. His last HD session was last Saturday. He also reports mechanical fall on Saturday after HD while getting off bus and hit his head. He reports having a dry cough and shortness of breath over the past week. Denies fever, chills, and abd pain.  80 yo M with PMHx CAD s/p CABG x4 (2007), PCI to OM1 2/22, HFrEF (12/23 EF 30%), PAD s/p multiple peripheral interventions with L pop aneurysm repair c/b compartment syndrome with fasciotomy and subsequent LLE bypass (3/19), RLE bypass (4/20), likely undiagnosed clotting disorder on Eliquis ESRD on HD TTS via perm cath R chest, HTN, DM, BPH who presents for hyperkalemia during preop for elective L AVF placement with vascular surgery. Patient reports missing dialysis 2x this past week due to the cold weather and is reliant on public transportation. His last HD session was last Saturday. He also reports mechanical fall on Saturday after HD while getting off bus and hit his head. He reports having a dry cough and shortness of breath over the past week. Denies fever, chills, and abd pain.     In the ED:  Initial vital signs: /92, HR 95, Temp 97.9 degrees F, Sp02 97% on room air   CT Head: No evidence of acute intracranial pathology.   80 yo M with PMHx CAD s/p CABG x4 (2007), PCI to OM1 2/22, HFrEF (12/23 EF 30%), PAD s/p multiple peripheral interventions with L pop aneurysm repair c/b compartment syndrome with fasciotomy and subsequent LLE bypass (3/19), RLE bypass (4/20), likely undiagnosed clotting disorder on Eliquis ESRD on HD TTS via perm cath R chest, HTN, DM, BPH who presents for hyperkalemia during preop for elective L AVF placement with vascular surgery. Patient reports missing dialysis 2x this past week due to the cold weather and is reliant on public transportation. His last HD session was last Saturday. He also reports mechanical fall on Saturday after HD while getting off bus and hit his head. He reports having a dry cough and shortness of breath over the past week. Denies fever, chills, and abd pain.     In the ED:  Initial vital signs: /92, HR 95, Temp 97.9 degrees F, Sp02 97% on room air   Labs significant for: Hgb 8.2, K 6.3, CO2 12, Anion gap 25, BUN/Cr 97/11.78, Calcium 8.2, ASTALT 25/58, Phos 9.4  CT Head: No evidence of acute intracranial pathology.  Interventions: Eliquis 5mg x1, Azithromycin 500mg x1, Calcium Gluconate 1g x1, CTX 1g x1, Insulin 5units IVP x1 78 yo M with PMHx CAD s/p CABG x4 (2007), PCI to OM1 2/22, HFrEF (12/23 EF 30%), PAD s/p multiple peripheral interventions with L pop aneurysm repair c/b compartment syndrome with fasciotomy and subsequent LLE bypass (3/19), RLE bypass (4/20), likely undiagnosed clotting disorder on Eliquis ESRD on HD TTS via perm cath R chest, HTN, DM, BPH who presents for hyperkalemia during preop for elective L AVF placement with vascular surgery. Patient reports missing dialysis 2x this past week due to the cold weather and is reliant on public transportation. His last HD session was last Saturday. He also reports mechanical fall on Saturday after HD while getting off bus and hit his head. He reports having a dry cough and shortness of breath over the past week. Denies fever, chills, and abd pain.     In the ED:  Initial vital signs: /92, HR 95, Temp 97.9 degrees F, Sp02 97% on room air   EKG: RBBB unchanged from prior  Labs significant for: Hgb 8.2, K 6.3, CO2 12, Anion gap 25, BUN/Cr 97/11.78, Calcium 8.2, ASTALT 25/58, Phos 9.4  CT Head: No evidence of acute intracranial pathology.  Interventions: Eliquis 5mg x1, Azithromycin 500mg x1, Calcium Gluconate 1g x1, CTX 1g x1, Insulin 5units IVP x1

## 2025-01-10 NOTE — H&P ADULT - PROBLEM SELECTOR PLAN 6
PAD s/p multiple peripheral interventions with L pop aneurysm repair c/b compartment syndrome with fasciotomy and subsequent LLE bypass (3/19), RLE bypass (4/20)  - c/w home aspirin PMHx CAD s/p CABG x4 (2007), PCI to OM1 2/22. Home med: aspirin, atorvastatin 80mg, ezetimibe.  - c/w home meds

## 2025-01-10 NOTE — CHART NOTE - NSCHARTNOTEFT_GEN_A_CORE
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    Patient apparently suffered a fall this week and missed dialysis 2x on Tuesday, Thursday due to extreme cold this week and being reliant on public transportation. Last HD session Saturday. Also suffered mechanical fall on Saturday after HD while getting off bus and struck head. K on iStat 6.7. BMP pending. No EKG changes. Avulsion to left orbit. On room air but slightly tachypneic without effort or distress. 1+ bilateral lower extremity edema.     Plan:   - Cancel elective AVF today.   - 10mg lokelma STAT given in preop area prior to ED transfer  - CTH given recent mechanical fall on Saturday after dialysis while getting off bus  - CXR   - Patient to be transported to [facility] ED for medicine admission, HD via perm cath  - Will reschedule fistula for Tuesday 1/14  - From vascular perspective, no surgical reason to prevent discharge following dialysis   - *Can resume Eliquis if CTH negative and again hold it 2 days prior to surgery*  - Left arm limb precautions (Pink band)    - *pt should show up at his home dialysis center tomorrow and again on Monday at 2PM for an additional, preoperative, session*    d/w attending and patient. ED  alerted to patient arrival and renal fellow made aware.
Update: Feeling better on HD. Of note, CXR showed possible opacity and is getting treated for CA pneumonia. Will have him complete ABX course for possible pneumonia and reschedule AVF when appropriate (not Tuesday 1/14/25). C/w regularly scheduled dialysis and Eliquis. My office will reach out to him for a new surgery date. I explained this to the patient as well and he understands.

## 2025-01-10 NOTE — H&P ADULT - PROBLEM SELECTOR PLAN 4
ESRD on dialysis TThSat. Has R chest permacath, planning for L AV fistula.   - Urgent HD today for hyperkalemia  - Plan for dialysis tomorrow   - outpatient rescheduling with vascular surgery for AVF of L arm   - left arm limb precautions  - f/u nephro recs Patient reports falling after getting off bus after dialysis last Saturday and hit head. Has bruising to Left forehead and eye. Currently not in pain. CT head with no evidence of acute intracranial pathology.  - Tylenol 650mg Q6hrs as needed for pain   - PT consult

## 2025-01-10 NOTE — ED PROVIDER NOTE - PHYSICAL EXAMINATION
CONSTITUTIONAL: Well-appearing; well-nourished; in no apparent distress.   HEAD: Normocephalic; atraumatic.   EYES:  conjunctiva and sclera clear, L orbit with bruising and healing laceration over L eyebrow, no bleeding.  ENT: normal nose; no rhinorrhea;  NECK: Supple; full ROM  RESPIRATORY: Breathing easily; no resp difficulty  EXT: No cyanosis or edema;  SKIN: Normal for age and race; warm; dry; good turgor; no apparent lesions or rash.   NEURO: A & O x 3; face symmetric; grossly unremarkable.   PSYCHOLOGICAL: The patient’s mood and manner are appropriate.

## 2025-01-10 NOTE — ED ADULT TRIAGE NOTE - CHIEF COMPLAINT QUOTE
sent from same day surgery (scheduled for HD access placement today) with elevated K (6.7) ; denies chest pain, shortness of breath

## 2025-01-10 NOTE — H&P ADULT - PROBLEM SELECTOR PLAN 7
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

## 2025-01-10 NOTE — H&P ADULT - NSHPPHYSICALEXAM_GEN_ALL_CORE
VITAL SIGNS:  T(C): 36.7 (01-10-25 @ 12:40), Max: 36.7 (01-10-25 @ 12:40)  T(F): 98 (01-10-25 @ 12:40), Max: 98 (01-10-25 @ 12:40)  HR: 80 (01-10-25 @ 12:40) (80 - 95)  BP: 138/67 (01-10-25 @ 12:40) (115/64 - 138/67)  BP(mean): --  RR: 20 (01-10-25 @ 12:40) (16 - 20)  SpO2: 97% (01-10-25 @ 12:40) (97% - 99%)  Wt(kg): --    PHYSICAL EXAM:  Constitutional: NAD;  Head: NC/AT, Left eye and forehead with bruising;  Eyes: PERRL, EOMI, anicteric sclera;  ENT: no nasal discharge;   Neck: supple;   Respiratory: CTA B/L;   Cardiac: +S1/S2; RRR;   Gastrointestinal: abdomen soft, NT/ND;   Extremities: WWP, no clubbing nor peripheral edema; LLE with chronic skin changes hyperpigmentation;   Musculoskeletal: NROM x4;  Neurologic: AAOx3;

## 2025-01-10 NOTE — ED ADULT NURSE NOTE - OBJECTIVE STATEMENT
Pt is 79 y.o male pt sent in from [facility] (scheduled for HD access placement today) with elevated K (6.7) ; denies chest pain, shortness of breath. Pt A&Ox4, NAD and conversive in full sentences. EKG in prog. Pt has perm cath R chest wall. Scheduled HD T-Th-Sat. Missed Thursday session due to cold weather per pt. Pt has IV on the R hand. Assessment ongoing. Pt is 79 y.o male pt sent in from [facility] (scheduled for HD access placement today) with elevated K (6.7) ; denies chest pain, shortness of breath. Pt A&Ox4, NAD and conversive in full sentences. EKG in prog. Pt has perm cath R chest wall. Scheduled HD T-Th-Sat. Missed Thursday session due to cold weather per pt. Pt has IV on the R hand. Pt also reports he fell Saturday with head injury on eliquis, with bruising on abrasion on the forehead, denies LOC. Assessment ongoing.

## 2025-01-10 NOTE — H&P ADULT - PROBLEM SELECTOR PLAN 1
S/p hyperkalemia cocktail in ED. K 6.5 -> 6.3  - f/u post-dialysis labs Found to have K 6.5 in pre-op for L AVF with vascular surgery. Received hyperkalemia cocktail in ED, now getting session of HD. Patient reports missing last 2 sessions of HD due to cold weather, which is likely reason for hyperkalemia.   - f/u post-dialysis labs Found to have K 6.5 in pre-op for L AVF with vascular surgery. Received hyperkalemia cocktail in ED, now getting session of HD. Patient reports missing last 2 sessions of HD due to cold weather, which is likely reason for hyperkalemia. EKG without signs of hyperkalemia  - f/u post-dialysis labs  - HD session today and tomorrow

## 2025-01-10 NOTE — ED ADULT NURSE NOTE - NSFALLUNIVINTERV_ED_ALL_ED
Bed/Stretcher in lowest position, wheels locked, appropriate side rails in place/Call bell, personal items and telephone in reach/Instruct patient to call for assistance before getting out of bed/chair/stretcher/Non-slip footwear applied when patient is off stretcher/[place] to call system/Physically safe environment - no spills, clutter or unnecessary equipment/Purposeful proactive rounding/Room/bathroom lighting operational, light cord in reach

## 2025-01-10 NOTE — ED ADULT NURSE REASSESSMENT NOTE - NS ED NURSE REASSESS COMMENT FT1
Pt received from night shift RN, pt is awake and alert and conversive in full sentences. Denies CP or SOB. Assessment ongoing.

## 2025-01-10 NOTE — H&P ADULT - ATTENDING COMMENTS
Mr. Oliverio Frausto is an 79/M with CAD s/p CABG in 2007 and PCI in 2022, chronic HFrEF, PAD, hypercoagulable condition on Eliquis and ESRD on HD who missed HD on Tuesday and Thursday due to the cold weather. He had his last HD on 1/4/25 (Saturday) during which time he had a mechanical fall and sustained a left periorbital trauma. He was scheduled for an elective AVF creation by vascular surgery today but due to hyperkalemia the procedure is postponed for next week and the patient was transferred to the ED. The patient reports intermittent dry cough but denies chest pain, dyspnea, recent sick contact (although unsure about HD patients he was in contact with on Saturday), recent travels. He denies headache, blurring of vision, focal weakness, numbness, fever, chills, abdominal pain, bleeding symptoms. On examination he is alert and oriented x 3 and not in distress, left periorbital ecchymosis noted, EOMs intact, PERRL, no oral mucosal bleeding, neck nontender and supple with good ROM, no JVD, clear breath sounds, NRRR with normal S1 and S2, abdomen soft and nontender, chronic bilateral LE skin changes with pulses felt bilaterally. Labs and other studies reviewed. Pre-HD K 6.3 BUN 97 Cr 11.7, WBC 10 Hgb 8.2 Plt 189. CXR with right basilar opacity but no obvious consoliation, with pneumobilia. CTH no acute pathology.     Hyperkalemia 2/2 missed HD  ESRD on HD  Intermittent cough   Chronic anemia  CAD  Chronic HFrEF, not in acute exacerbation  Hypercoagulable state on anticoagulation  PAD  Pneumobilia    Patient undergoing HD today. Obtain post HD labs. Resume Eliquis given negative CTH and per vascular surgery hold Eliquis 2 days prior to planned AVF tentatively next week. Send RVP for intermittent cough. Repeat CXR for the incidentally found pneumobilia on CXR and if persistent then consider CTAP (discussed with radiologist in the reading room). Reconcile home meidcations. DVT prophylaxis. Patient properly advised and educated. Mr. Oliverio Frausto is an 79/M with CAD s/p CABG in 2007 and PCI in 2022, chronic HFrEF, PAD, hypercoagulable condition on Eliquis and ESRD on HD who missed HD on Tuesday and Thursday due to the cold weather. He had his last HD on 1/4/25 (Saturday) during which time he had a mechanical fall and sustained a left periorbital trauma. He was scheduled for an elective AVF creation by vascular surgery today but due to hyperkalemia the procedure is postponed for next week and the patient was transferred to the ED. The patient reports intermittent dry cough but denies chest pain, dyspnea, recent sick contact (although unsure about HD patients he was in contact with on Saturday), recent travels. He denies headache, blurring of vision, focal weakness, numbness, fever, chills, abdominal pain, bleeding symptoms. On examination he is alert and oriented x 3 and not in distress, left periorbital ecchymosis noted, EOMs intact, PERRL, no oral mucosal bleeding, neck nontender and supple with good ROM, no JVD, clear breath sounds, NRRR with normal S1 and S2, abdomen soft and nontender, chronic bilateral LE skin changes with pulses felt bilaterally. Labs and other studies reviewed. Pre-HD K 6.3 BUN 97 Cr 11.7, WBC 10 Hgb 8.2 Plt 189. CXR with right basilar opacity  with pneumobilia. CTH no acute pathology.     Hyperkalemia 2/2 missed HD  ESRD on HD  CAP  Recent mechanical fall  Chronic anemia  CAD  Chronic HFrEF, not in acute exacerbation  Hypercoagulable state on anticoagulation  PAD  Pneumobilia    Patient undergoing HD today. Obtain post HD labs. Resume Eliquis given negative CTH and per vascular surgery hold Eliquis 2 days prior to planned AVF tentatively next week. Send RVP for intermittent cough. Repeat CXR for the incidentally found pneumobilia on CXR and if persistent then consider CTAP (discussed with radiologist in the reading room). Reconcile home medications. PT eval. DVT prophylaxis. Patient properly advised and educated. Mr. Oliverio Frausto is an 79/M with CAD s/p CABG in 2007 and PCI in 2022, chronic HFrEF, PAD, hypercoagulable condition on Eliquis and ESRD on HD who missed HD on Tuesday and Thursday due to the cold weather. He had his last HD on 1/4/25 (Saturday) during which time he had a mechanical fall and sustained a left periorbital trauma. He was scheduled for an elective AVF creation by vascular surgery today but due to hyperkalemia the procedure is postponed for next week and the patient was transferred to the ED. The patient reports intermittent dry cough but denies chest pain, dyspnea, recent sick contact (although unsure about HD patients he was in contact with on Saturday), recent travels. He denies headache, blurring of vision, focal weakness, numbness, fever, chills, abdominal pain, bleeding symptoms. On examination he is alert and oriented x 3 and not in distress, left periorbital ecchymosis noted, EOMs intact, PERRL, no oral mucosal bleeding, neck nontender and supple with good ROM, no JVD, clear breath sounds, NRRR with normal S1 and S2, abdomen soft and nontender, chronic bilateral LE skin changes with pulses felt bilaterally. Labs and other studies reviewed. Pre-HD K 6.3 BUN 97 Cr 11.7, WBC 10 Hgb 8.2 Plt 189. CXR with right basilar opacity  with pneumobilia. CTH no acute pathology.     Hyperkalemia 2/2 missed HD  ESRD on HD  CAP  Recent mechanical fall  Chronic anemia  CAD  Chronic HFrEF, not in acute exacerbation  Hypercoagulable state on anticoagulation  PAD  Pneumobilia    Patient undergoing HD today. Obtain post HD labs. Resume Eliquis given negative CTH and per vascular surgery hold Eliquis 2 days prior to planned AVF tentatively next week. Send RVP for intermittent cough. Repeat CXR for the incidentally found pneumobilia on CXR and if persistent then consider CTAP (discussed with radiologist in the reading room). On Ceftriaxone and Azithromycin. Reconcile home medications. PT eval. DVT prophylaxis. Patient properly advised and educated. Mr. Oliverio Frausto is an 79/M with CAD s/p CABG in 2007 and PCI in 2022, chronic HFrEF, PAD, hypercoagulable condition on Eliquis and ESRD on HD who missed HD on Tuesday and Thursday due to the cold weather. He had his last HD on 1/4/25 (Saturday) during which time he had a mechanical fall and sustained a left periorbital trauma. He was scheduled for an elective AVF creation by vascular surgery today but due to hyperkalemia the procedure is postponed for next week and the patient was transferred to the ED. The patient reports intermittent dry cough but denies chest pain, dyspnea, recent sick contact (although unsure about HD patients he was in contact with on Saturday), recent travels. He denies headache, blurring of vision, focal weakness, numbness, fever, chills, abdominal pain, bleeding symptoms. On examination he is alert and oriented x 3 and not in distress, left periorbital ecchymosis noted, EOMs intact, PERRL, no oral mucosal bleeding, neck nontender and supple with good ROM, no JVD, clear breath sounds, NRRR with normal S1 and S2, abdomen soft and nontender, chronic bilateral LE skin changes with pulses felt bilaterally. Labs and other studies reviewed. Pre-HD K 6.3 BUN 97 Cr 11.7, WBC 10 Hgb 8.2 Plt 189. CXR with right basilar opacity  with pneumobilia. CTH no acute pathology.     Hyperkalemia 2/2 missed HD  ESRD on HD  CAP  Recent mechanical fall  Chronic anemia  CAD  Chronic HFrEF, not in acute exacerbation  Hypercoagulable state on anticoagulation  PAD  Pneumobilia    Patient undergoing HD today. Obtain post HD labs. Resume Eliquis given negative CTH and per vascular surgery hold Eliquis 2 days prior to planned AVF tentatively next week. Send RVP for intermittent cough. Repeat CXR for the incidentally found pneumobilia on CXR and if persistent then consider CTAP (discussed with radiologist in the reading room). On Ceftriaxone and Azithromycin. Reconcile home medications. PT eval. Obtain outpatient records for history of clotting disorder or hypercoagulable state, will need outpatient follow up with PCP/hematologist. DVT prophylaxis. Patient properly advised and educated.

## 2025-01-10 NOTE — ED PROVIDER NOTE - OBJECTIVE STATEMENT
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  Patient apparently suffered a mechanical fall this week with L orbital trauma and missed dialysis 2x on Tuesday, Thursday due to extreme cold this week and being reliant on public transportation. Last HD session Saturday. Also suffered mechanical fall on Saturday after HD while getting off bus and struck head. K on iStat 6.7 in preop, as per vascular, EKG was done and no EKG changes. Pt also complaining of cough and SOB for a few days, denies fevers.

## 2025-01-10 NOTE — PROVIDER CONTACT NOTE (CRITICAL VALUE NOTIFICATION) - ASSESSMENT
pt has some bruising on left eye and a small wound on forehead. istat K+6.7,, reports no pain, vs stable, afebrile

## 2025-01-10 NOTE — H&P ADULT - PROBLEM SELECTOR PLAN 3
Patient reports falling after getting off bus after dialysis last Saturday and hit head. Has bruising to Left forehead and eye. Currently not in pain. CT head with no evidence of acute intracranial pathology.  - tylenol 650mg Q6hrs as needed for pain   - PT consult CXR with pneumobilia. No recent abdominal surgery.   - Repeat CXR to reassess for pneumobilia -> if persistent get CT A/P for further assessment

## 2025-01-10 NOTE — H&P ADULT - PROBLEM SELECTOR PLAN 9
Home meds: Carvedilol 6.25mg BID and Entresto 24-26 BID.   - holding home med on dialysis days, restart as tolerated HFrEF (12/23 EF 30%). Home med: Carvelilol 6.25mg BID, Entresto 24-26mg BID  - hold home meds on dialysis days, restart as tolerated

## 2025-01-10 NOTE — H&P ADULT - PROBLEM SELECTOR PLAN 10
Home med: Jardiance. A1c 5.4%  - c/w jardiance Home meds: Carvedilol 6.25mg BID and Entresto 24-26 BID.   - holding home med on dialysis days, restart as tolerated

## 2025-01-10 NOTE — CONSULT NOTE ADULT - ATTENDING COMMENTS
Case reviewed and pt seen at bedside.    As above, pt is an 78 y/o M  w/ ESRD TTS scheduled via Right chest TDC, Missed  dialysis x2, last). , h/o HTN, HFrEF (30%) CAD s/p CABG(2007) with multiple PCI, PAD w/ multiple interventions on eliquis,  here for AVF creation, found to be hyperkalemic K 6.5, given lokelma 10g , no EKG changes, nephrology consulted for HD management.    ESRD with hyperkalemia due to missed HD  Agree with HD plan as outlined by fellow, Dr. Helms.

## 2025-01-10 NOTE — H&P ADULT - PROBLEM SELECTOR PLAN 8
HFrEF (12/23 EF 30%). Home med: Carvelilol 6.25mg BID, Entresto 24-26mg BID  - hold home meds on dialysis days, restart as tolerated Home med: Eliquis 5mg BID  - c/w home med

## 2025-01-10 NOTE — PROVIDER CONTACT NOTE (CRITICAL VALUE NOTIFICATION) - BACKGROUND
pt is here for surgery for left AV fistula, stated has not had Hemodialysis since Saturday secondary to a fall.

## 2025-01-10 NOTE — ED PROVIDER NOTE - CLINICAL SUMMARY MEDICAL DECISION MAKING FREE TEXT BOX
ct head done and no acute findings . laceration to forehead too old for repair, to heal by secondary intention  meds given for elevated K via IV, lokelma already given in preop area  ekg w/ known rbbb, no peaked Ts  coughing in ED - will send flu swab and cxr with opacity, covered for CAP

## 2025-01-10 NOTE — CHART NOTE - NSCHARTNOTEFT_GEN_A_CORE
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    Patient apparently suffered a fall this week and missed dialysis 2x. Last HD session Saturday. K on iStat 6.7. No EKG changes. No obvious signs of volume overload. BMP pending.     Plan:   - Cancel elective AVF today.   - 10mg lokelma STAT  - Patient to be transported to [facility] ED for medicine admission, HD via perm cath  - Will reschedule fistula for sometime next week at some time TBD. From vascular perspective, no surgical reason to prevent discharge following dialysis. Can resume Eliquis and again hold it 2 days prior to surgery.     d/w attending and patient. ED  alerted to patient arrival and renal fellow made aware. 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    Patient apparently suffered a fall this week and missed dialysis 2x on Tuesday, Thursday due to extreme cold this week and being reliant on public transportation. Last HD session Saturday. K on iStat 6.7. BMP pending. No EKG changes. On room air but slightly tachypneic without effort or distress. 1+ bilateral lower extremity edema.     Plan:   - Cancel elective AVF today.   - 10mg lokelma STAT  - Patient to be transported to [facility] ED for medicine admission, HD via perm cath  - Will reschedule fistula for sometime next week at some time TBD. From vascular perspective, no surgical reason to prevent discharge following dialysis. Can resume Eliquis and again hold it 2 days prior to surgery.     d/w attending and patient. ED  alerted to patient arrival and renal fellow made aware. 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    Patient apparently suffered a fall this week and missed dialysis 2x on Tuesday, Thursday due to extreme cold this week and being reliant on public transportation. Last HD session Saturday. Also suffered mechanical fall on Saturday after HD while getting off bus and struck head. K on iStat 6.7. BMP pending. No EKG changes. Avulsion to left orbit. On room air but slightly tachypneic without effort or distress. 1+ bilateral lower extremity edema.     Plan:   - Cancel elective AVF today.   - 10mg lokelma STAT given in preop area prior to ED transfer  - CTH given recent mechanical fall on Saturday after dialysis while getting off bus  - CXR   - Patient to be transported to [facility] ED for medicine admission, HD via perm cath  - Will reschedule fistula for Tuesday 1/14  - From vascular perspective, no surgical reason to prevent discharge following dialysis.   - Can resume Eliquis if CTH negative and again hold it 2 days prior to surgery.   - Left arm limb precautions (Pink band)    d/w attending and patient. ED  alerted to patient arrival and renal fellow made aware.

## 2025-01-10 NOTE — ED PROVIDER NOTE - CRITICAL CARE ATTENDING CONTRIBUTION TO CARE
Upon my evaluation, this patient had a high probability of imminent or life-threatening deterioration due to acute hyperkalemia requiring urgent hemodialysis and IV medications, vascular and renal consultation, which required my direct attention, intervention, and personal management.    I have personally provided critical care time exclusive of time spent on separately billable procedures. Time includes review of laboratory data, radiology results, discussion with consultants, and monitoring for potential decompensation. Interventions were performed as documented above.

## 2025-01-10 NOTE — PROVIDER CONTACT NOTE (CRITICAL VALUE NOTIFICATION) - RECOMMENDATIONS
Dr. Quesada at bedside with patient. informed him surgery is cancelled for today and will need hemodialysis today

## 2025-01-10 NOTE — H&P ADULT - PROBLEM SELECTOR PLAN 11
F: None  E: replete as necessary  N: renal diet  DVT: heparin SubQ  Dispo: F Home med: Jardiance. A1c 5.4%  - c/w jardiance

## 2025-01-11 LAB
ALBUMIN SERPL ELPH-MCNC: 3.6 G/DL — SIGNIFICANT CHANGE UP (ref 3.3–5)
ALP SERPL-CCNC: 95 U/L — SIGNIFICANT CHANGE UP (ref 40–120)
ALT FLD-CCNC: 46 U/L — HIGH (ref 10–45)
ANION GAP SERPL CALC-SCNC: 17 MMOL/L — SIGNIFICANT CHANGE UP (ref 5–17)
AST SERPL-CCNC: 20 U/L — SIGNIFICANT CHANGE UP (ref 10–40)
BASOPHILS # BLD AUTO: 0.08 K/UL — SIGNIFICANT CHANGE UP (ref 0–0.2)
BASOPHILS NFR BLD AUTO: 1 % — SIGNIFICANT CHANGE UP (ref 0–2)
BILIRUB SERPL-MCNC: 0.4 MG/DL — SIGNIFICANT CHANGE UP (ref 0.2–1.2)
BUN SERPL-MCNC: 46 MG/DL — HIGH (ref 7–23)
CALCIUM SERPL-MCNC: 8.3 MG/DL — LOW (ref 8.4–10.5)
CHLORIDE SERPL-SCNC: 98 MMOL/L — SIGNIFICANT CHANGE UP (ref 96–108)
CO2 SERPL-SCNC: 22 MMOL/L — SIGNIFICANT CHANGE UP (ref 22–31)
CREAT SERPL-MCNC: 7.13 MG/DL — HIGH (ref 0.5–1.3)
EGFR: 7 ML/MIN/1.73M2 — LOW
EOSINOPHIL # BLD AUTO: 0.11 K/UL — SIGNIFICANT CHANGE UP (ref 0–0.5)
EOSINOPHIL NFR BLD AUTO: 1.4 % — SIGNIFICANT CHANGE UP (ref 0–6)
GLUCOSE SERPL-MCNC: 97 MG/DL — SIGNIFICANT CHANGE UP (ref 70–99)
HCT VFR BLD CALC: 26.3 % — LOW (ref 39–50)
HGB BLD-MCNC: 8.1 G/DL — LOW (ref 13–17)
IMM GRANULOCYTES NFR BLD AUTO: 0.8 % — SIGNIFICANT CHANGE UP (ref 0–0.9)
LYMPHOCYTES # BLD AUTO: 0.82 K/UL — LOW (ref 1–3.3)
LYMPHOCYTES # BLD AUTO: 10.3 % — LOW (ref 13–44)
MAGNESIUM SERPL-MCNC: 1.7 MG/DL — SIGNIFICANT CHANGE UP (ref 1.6–2.6)
MCHC RBC-ENTMCNC: 29.6 PG — SIGNIFICANT CHANGE UP (ref 27–34)
MCHC RBC-ENTMCNC: 30.8 G/DL — LOW (ref 32–36)
MCV RBC AUTO: 96 FL — SIGNIFICANT CHANGE UP (ref 80–100)
MONOCYTES # BLD AUTO: 0.19 K/UL — SIGNIFICANT CHANGE UP (ref 0–0.9)
MONOCYTES NFR BLD AUTO: 2.4 % — SIGNIFICANT CHANGE UP (ref 2–14)
NEUTROPHILS # BLD AUTO: 6.68 K/UL — SIGNIFICANT CHANGE UP (ref 1.8–7.4)
NEUTROPHILS NFR BLD AUTO: 84.1 % — HIGH (ref 43–77)
NRBC # BLD: 0 /100 WBCS — SIGNIFICANT CHANGE UP (ref 0–0)
PHOSPHATE SERPL-MCNC: 6.2 MG/DL — HIGH (ref 2.5–4.5)
PLATELET # BLD AUTO: 181 K/UL — SIGNIFICANT CHANGE UP (ref 150–400)
POTASSIUM SERPL-MCNC: 4.8 MMOL/L — SIGNIFICANT CHANGE UP (ref 3.5–5.3)
POTASSIUM SERPL-SCNC: 4.8 MMOL/L — SIGNIFICANT CHANGE UP (ref 3.5–5.3)
PROT SERPL-MCNC: 7 G/DL — SIGNIFICANT CHANGE UP (ref 6–8.3)
RBC # BLD: 2.74 M/UL — LOW (ref 4.2–5.8)
RBC # FLD: 17.1 % — HIGH (ref 10.3–14.5)
SODIUM SERPL-SCNC: 137 MMOL/L — SIGNIFICANT CHANGE UP (ref 135–145)
WBC # BLD: 7.94 K/UL — SIGNIFICANT CHANGE UP (ref 3.8–10.5)
WBC # FLD AUTO: 7.94 K/UL — SIGNIFICANT CHANGE UP (ref 3.8–10.5)

## 2025-01-11 PROCEDURE — 99233 SBSQ HOSP IP/OBS HIGH 50: CPT | Mod: GC

## 2025-01-11 PROCEDURE — 90937 HEMODIALYSIS REPEATED EVAL: CPT

## 2025-01-11 PROCEDURE — 74177 CT ABD & PELVIS W/CONTRAST: CPT | Mod: 26

## 2025-01-11 RX ORDER — SEVELAMER CARBONATE 800 MG/1
800 TABLET, FILM COATED ORAL
Refills: 0 | Status: DISCONTINUED | OUTPATIENT
Start: 2025-01-11 | End: 2025-01-12

## 2025-01-11 RX ADMIN — SEVELAMER CARBONATE 800 MILLIGRAM(S): 800 TABLET, FILM COATED ORAL at 17:56

## 2025-01-11 RX ADMIN — CEFTRIAXONE SODIUM 100 MILLIGRAM(S): 1 INJECTION, POWDER, FOR SOLUTION INTRAMUSCULAR; INTRAVENOUS at 09:09

## 2025-01-11 RX ADMIN — SEVELAMER CARBONATE 800 MILLIGRAM(S): 800 TABLET, FILM COATED ORAL at 09:11

## 2025-01-11 RX ADMIN — SEVELAMER CARBONATE 800 MILLIGRAM(S): 800 TABLET, FILM COATED ORAL at 12:28

## 2025-01-11 RX ADMIN — Medication 81 MILLIGRAM(S): at 12:27

## 2025-01-11 RX ADMIN — APIXABAN 5 MILLIGRAM(S): 5 TABLET, FILM COATED ORAL at 17:56

## 2025-01-11 RX ADMIN — ATORVASTATIN CALCIUM 80 MILLIGRAM(S): 40 TABLET, FILM COATED ORAL at 22:37

## 2025-01-11 RX ADMIN — APIXABAN 5 MILLIGRAM(S): 5 TABLET, FILM COATED ORAL at 06:31

## 2025-01-11 RX ADMIN — AZITHROMYCIN MONOHYDRATE 255 MILLIGRAM(S): 200 POWDER, FOR SUSPENSION ORAL at 09:09

## 2025-01-11 RX ADMIN — PANTOPRAZOLE 20 MILLIGRAM(S): 40 TABLET, DELAYED RELEASE ORAL at 06:30

## 2025-01-11 NOTE — PROGRESS NOTE ADULT - PROBLEM SELECTOR PLAN 5
ESRD on dialysis TThSat. Has R chest permacath, planning for L AV fistula.   - Urgent HD today for hyperkalemia  - Plan for dialysis tomorrow   - outpatient rescheduling with vascular surgery for AVF of L arm   - left arm limb precautions  - f/u nephro recs ESRD on dialysis TThSat. Has R chest permacath, planning for L AV fistula.   - Urgent HD yest for hyperkalemia  - Plan for dialysis today  - outpatient rescheduling with vascular surgery for AVF of L arm   - left arm limb precautions  - f/u nephro recs

## 2025-01-11 NOTE — PHYSICAL THERAPY INITIAL EVALUATION ADULT - CRITERIA FOR SKILLED THERAPEUTIC INTERVENTIONS
impairments found/functional limitations in following categories/risk reduction/prevention/anticipated discharge recommendation [Annual] : an annual visit.

## 2025-01-11 NOTE — PROGRESS NOTE ADULT - SUBJECTIVE AND OBJECTIVE BOX
NEPHROLOGY PROGRESS NOTE:    Patient was seen and evaluated on dialysis. Vitals stable. Patient is tolerating the procedure well    Vitals:  T(F): 98.8 (25 @ 13:50), Max: 99.1 (25 @ 00:39)  HR: 92 (25 @ 13:50)  BP: 167/90 (25 @ 13:50)  RR: 18 (25 @ 13:50)  SpO2: 96% (25 @ 13:50)      Weight (kg): 79 (01-10 @ 16:57)    Continue HD as prescribed  Hemodialysis Treatment.:     Schedule: Once, Modality: Hemodialysis, Access: Internal Jugular Central Venous Catheter    Dialyzer: Optiflux [id number], Time: 210 Min    Blood Flow: 400 mL/Min , Dialysate Flow: 500 mL/Min, Dialysate Temp: 36.5, Tubinmm (Adult)    Target Fluid Removal: 2 Liters    Dialysate Electrolytes (mEq/L): Potassium 3, Calcium 2.5, Sodium 138, Bicarbonate 35 (25 @ 05:26) [Completed]

## 2025-01-11 NOTE — PROGRESS NOTE ADULT - PROBLEM SELECTOR PLAN 6
PMHx CAD s/p CABG x4 (2007), PCI to OM1 2/22. Home med: aspirin, atorvastatin 80mg, ezetimibe.  - c/w home meds

## 2025-01-11 NOTE — PROGRESS NOTE ADULT - SUBJECTIVE AND OBJECTIVE BOX
NEPHROLOGY PROGRESS NOTE:    Interval history:  Patient seen and examined at bedside, denies any complaints    Vitals:  T(F): 98.8 (25 @ 13:50), Max: 99.1 (25 @ 00:39)  HR: 92 (25 @ 13:50)  BP: 167/90 (25 @ 13:50)  RR: 18 (25 @ 13:50)  SpO2: 96% (25 @ 13:50)  Wt(kg): --    01-10 @ 07:  -   @ 07:00  --------------------------------------------------------  IN: 0 mL / OUT: 1500 mL / NET: -1500 mL        Weight (kg): 79 (01-10 @ 16:57)    PE:  General: Not in acute distress, well-nourished  Neck: No visible mass, No JVD noted  Chest: CTAP b/l, no use of accessory respiratory muscles  Heart: RRR, S1/S2 wnl, no MRG  Abdomen: Soft, nontender, nondistended,  no hepatosplenomegaly  Extremities: No clubbing, cyanosis or edema  Neuro:  Alert, no apparent focal deficits, answer questions appropriately  Access: R chest TDC    Pertinent labs & Imagin-11    137  |  98  |  46[H]  ----------------------------<  97  4.8   |  22  |  7.13[H]    Ca    8.3[L]      2025 08:49  Phos  6.2       Mg     1.7         TPro  7.0  /  Alb  3.6  /  TBili  0.4  /  DBili      /  AST  20  /  ALT  46[H]  /  AlkPhos  95                            8.1    7.94  )-----------( 181      ( 2025 08:49 )             26.3       Urine Studies:  Urinalysis Basic - ( 2025 08:49 )    Color:  / Appearance:  / SG:  / pH:   Gluc: 97 mg/dL / Ketone:   / Bili:  / Urobili:    Blood:  / Protein:  / Nitrite:    Leuk Esterase:  / RBC:  / WBC    Sq Epi:  / Non Sq Epi:  / Bacteria:             MEDICATIONS  (STANDING):  apixaban 5 milliGRAM(s) Oral every 12 hours  aspirin  chewable 81 milliGRAM(s) Oral daily  atorvastatin 80 milliGRAM(s) Oral at bedtime  azithromycin  IVPB 500 milliGRAM(s) IV Intermittent every 24 hours  cefTRIAXone   IVPB 1000 milliGRAM(s) IV Intermittent every 24 hours  pantoprazole    Tablet 20 milliGRAM(s) Oral before breakfast  sevelamer carbonate 800 milliGRAM(s) Oral three times a day with meals      Hemodialysis Treatment.:     Schedule: Once, Modality: Hemodialysis, Access: Internal Jugular Central Venous Catheter    Dialyzer: Optiflux [id number], Time: 210 Min    Blood Flow: 400 mL/Min , Dialysate Flow: 500 mL/Min, Dialysate Temp: 36.5, Tubinmm (Adult)    Target Fluid Removal: 2 Liters    Dialysate Electrolytes (mEq/L): Potassium 3, Calcium 2.5, Sodium 138, Bicarbonate 35 (25 @ 05:26) [Completed]

## 2025-01-11 NOTE — PROGRESS NOTE ADULT - PROBLEM SELECTOR PLAN 1
Found to have K 6.5 in pre-op for L AVF with vascular surgery. Received hyperkalemia cocktail in ED, now getting session of HD. Patient reports missing last 2 sessions of HD due to cold weather, which is likely reason for hyperkalemia. EKG without signs of hyperkalemia  - f/u post-dialysis labs  - HD session today and tomorrow Found to have K 6.5 in pre-op for L AVF with vascular surgery. Received hyperkalemia cocktail in ED, now getting session of HD. Patient reports missing last 2 sessions of HD due to cold weather, which is likely reason for hyperkalemia. EKG without signs of hyperkalemia  - f/u post-dialysis labs  - HD session yesterday and today

## 2025-01-11 NOTE — PROGRESS NOTE ADULT - SUBJECTIVE AND OBJECTIVE BOX
**INCOMPLETE NOTE    INTERVAL/OVERNIGHT EVENTS: None    SUBJECTIVE:  Patient seen and examined at bedside, comfortable, NAD. Denied fever, chest pain, dyspnea, abdominal pain.     Vital Signs Last 12 Hrs  T(F): 97.9 (01-11-25 @ 05:40), Max: 99.1 (01-11-25 @ 00:39)  HR: 113 (01-11-25 @ 05:40) (113 - 113)  BP: 116/69 (01-11-25 @ 05:40) (116/69 - 141/77)  BP(mean): 98 (01-11-25 @ 00:39) (98 - 98)  RR: 19 (01-11-25 @ 00:39) (19 - 19)  SpO2: 95% (01-11-25 @ 05:40) (95% - 95%)  I&O's Summary    10 Mir 2025 07:01  -  11 Jan 2025 07:00  --------------------------------------------------------  IN: 0 mL / OUT: 1500 mL / NET: -1500 mL        PHYSICAL EXAM:  General: NAD  HEENT: PERRL, EOM intact, sclera anicteric, MMM  Cardiovascular: RRR; no MRG;   Respiratory: CTAB; no WRR  GI/: soft; NTND; BS x4  Extremities: WWP; 2+ peripheral pulses bilaterally; no LE edema  Skin: normal color & turgor; no rash  Neurologic: aox3; no focal deficits    LABS:                        8.1    7.94  )-----------( 181      ( 11 Jan 2025 08:49 )             26.3     01-11    137  |  98  |  46[H]  ----------------------------<  97  4.8   |  22  |  7.13[H]    Ca    8.3[L]      11 Jan 2025 08:49  Phos  6.2     01-11  Mg     1.7     01-11    TPro  7.0  /  Alb  3.6  /  TBili  0.4  /  DBili  x   /  AST  20  /  ALT  46[H]  /  AlkPhos  95  01-11    PT/INR - ( 10 Mir 2025 08:24 )   PT: 12.6 sec;   INR: 1.08          PTT - ( 10 Mir 2025 08:24 )  PTT:48.6 sec  Urinalysis Basic - ( 11 Jan 2025 08:49 )    Color: x / Appearance: x / SG: x / pH: x  Gluc: 97 mg/dL / Ketone: x  / Bili: x / Urobili: x   Blood: x / Protein: x / Nitrite: x   Leuk Esterase: x / RBC: x / WBC x   Sq Epi: x / Non Sq Epi: x / Bacteria: x          RADIOLOGY & ADDITIONAL TESTS:    MEDICATIONS  (STANDING):  apixaban 5 milliGRAM(s) Oral every 12 hours  aspirin  chewable 81 milliGRAM(s) Oral daily  atorvastatin 80 milliGRAM(s) Oral at bedtime  azithromycin  IVPB 500 milliGRAM(s) IV Intermittent every 24 hours  cefTRIAXone   IVPB 1000 milliGRAM(s) IV Intermittent every 24 hours  pantoprazole    Tablet 20 milliGRAM(s) Oral before breakfast  sevelamer carbonate 800 milliGRAM(s) Oral three times a day with meals    MEDICATIONS  (PRN):   INTERVAL/OVERNIGHT EVENTS: None    SUBJECTIVE:  Patient seen and examined at bedside, comfortable, NAD. Denied fever, chest pain, dyspnea, abdominal pain.     Vital Signs Last 12 Hrs  T(F): 97.9 (01-11-25 @ 05:40), Max: 99.1 (01-11-25 @ 00:39)  HR: 113 (01-11-25 @ 05:40) (113 - 113)  BP: 116/69 (01-11-25 @ 05:40) (116/69 - 141/77)  BP(mean): 98 (01-11-25 @ 00:39) (98 - 98)  RR: 19 (01-11-25 @ 00:39) (19 - 19)  SpO2: 95% (01-11-25 @ 05:40) (95% - 95%)  I&O's Summary    10 Mir 2025 07:01  -  11 Jan 2025 07:00  --------------------------------------------------------  IN: 0 mL / OUT: 1500 mL / NET: -1500 mL        PHYSICAL EXAM:  Constitutional: NAD;  Head: NC/AT, Left eye and forehead with bruising;  Eyes: PERRL, EOMI, anicteric sclera;  ENT: no nasal discharge;   Neck: supple;   Respiratory: CTA B/L;   Cardiac: +S1/S2; RRR;   Gastrointestinal: abdomen soft, NT/ND;   Extremities: WWP, no clubbing nor peripheral edema; LLE with chronic skin changes hyperpigmentation;   Musculoskeletal: NROM x4;  Neurologic: AAOx3;    LABS:                        8.1    7.94  )-----------( 181      ( 11 Jan 2025 08:49 )             26.3     01-11    137  |  98  |  46[H]  ----------------------------<  97  4.8   |  22  |  7.13[H]    Ca    8.3[L]      11 Jan 2025 08:49  Phos  6.2     01-11  Mg     1.7     01-11    TPro  7.0  /  Alb  3.6  /  TBili  0.4  /  DBili  x   /  AST  20  /  ALT  46[H]  /  AlkPhos  95  01-11    PT/INR - ( 10 Mir 2025 08:24 )   PT: 12.6 sec;   INR: 1.08          PTT - ( 10 Mir 2025 08:24 )  PTT:48.6 sec  Urinalysis Basic - ( 11 Jan 2025 08:49 )    Color: x / Appearance: x / SG: x / pH: x  Gluc: 97 mg/dL / Ketone: x  / Bili: x / Urobili: x   Blood: x / Protein: x / Nitrite: x   Leuk Esterase: x / RBC: x / WBC x   Sq Epi: x / Non Sq Epi: x / Bacteria: x          RADIOLOGY & ADDITIONAL TESTS:    MEDICATIONS  (STANDING):  apixaban 5 milliGRAM(s) Oral every 12 hours  aspirin  chewable 81 milliGRAM(s) Oral daily  atorvastatin 80 milliGRAM(s) Oral at bedtime  azithromycin  IVPB 500 milliGRAM(s) IV Intermittent every 24 hours  cefTRIAXone   IVPB 1000 milliGRAM(s) IV Intermittent every 24 hours  pantoprazole    Tablet 20 milliGRAM(s) Oral before breakfast  sevelamer carbonate 800 milliGRAM(s) Oral three times a day with meals    MEDICATIONS  (PRN):

## 2025-01-11 NOTE — PROGRESS NOTE ADULT - ASSESSMENT
80 y/o M  w/ ESRD TTS scheduled via Right chest TDC, Missed  dialysis x2, last). , h/o HTN, HFrEF (30%) CAD s/p CABG(2007) with multiple PCI, PAD w/ multiple interventions on eliquis,  here for AVF creation, found to be hyperkalemic , revision postponed , nephrology following for HD management    -For HD  today  with these parameters for solute and volume managememt  F 180 T 180 T 180 UF 2 3K bath      HTN/Volume status:   -UF with dialysis  -Hold BP meds before each dialysis session, can resume after HD    CKD-Anemia  - kindly request For iron studies, ferritin level  - HODAN with each HD session    CKD-MBS: Hyperphosphatemia and hypocalcemia , likely secondary hyperparathyroidism  Check iPTH level pleas    cont sevelamer 800 mg tid  Will follow  
80 yo M with PMHx CAD s/p CABG x4 (2007), PCI to OM1 2/22, HFrEF (12/23 EF 30%), PAD s/p multiple peripheral interventions with L pop aneurysm repair c/b compartment syndrome with fasciotomy and subsequent LLE bypass (3/19), RLE bypass (4/20), likely undiagnosed clotting disorder on eliquis, ESRD on HD TTS via perm cath R chest, HTN, DM, BPH who presents for hyperkalemia during preop for elective L AVF placement with vascular surgery,  admitted to medicine for further management.

## 2025-01-11 NOTE — PHYSICAL THERAPY INITIAL EVALUATION ADULT - ADDITIONAL COMMENTS
Pt reports living in apartment, with elevator access, alone. Reports being independent with daily activities with use of SC for ambulation.

## 2025-01-11 NOTE — PATIENT PROFILE ADULT - FALL HARM RISK - HARM RISK INTERVENTIONS

## 2025-01-11 NOTE — PROGRESS NOTE ADULT - PROBLEM SELECTOR PLAN 10
Home meds: Carvedilol 6.25mg BID and Entresto 24-26 BID.   - holding home med on dialysis days, restart as tolerated

## 2025-01-11 NOTE — PROGRESS NOTE ADULT - PROBLEM SELECTOR PLAN 4
Patient reports falling after getting off bus after dialysis last Saturday and hit head. Has bruising to Left forehead and eye. Currently not in pain. CT head with no evidence of acute intracranial pathology.  - Tylenol 650mg Q6hrs as needed for pain   - PT consult Patient reports falling after getting off bus after dialysis last Saturday and hit head. Has bruising to Left forehead and eye. Currently not in pain. CT head with no evidence of acute intracranial pathology.  - Tylenol 650mg Q6hrs as needed for pain   - PT consult, recommended outpatient PT

## 2025-01-11 NOTE — PHYSICAL THERAPY INITIAL EVALUATION ADULT - GENERAL OBSERVATIONS, REHAB EVAL
PT IE completed. Chart reviewed. Pt received semi-supine, NAD, +IV heplock. RN FIFI cleared pt for PT.

## 2025-01-11 NOTE — PROGRESS NOTE ADULT - PROBLEM SELECTOR PLAN 2
Patient with dry cough and sob. Found to have Right lower lobe consolidation. S/p CTX and Azithromycin in ED.  - c/w CTX and Azithromycin  - urine step and legionella  - f/u full RVP

## 2025-01-11 NOTE — PROGRESS NOTE ADULT - PROBLEM SELECTOR PLAN 9
HFrEF (12/23 EF 30%). Home med: Carvelilol 6.25mg BID, Entresto 24-26mg BID  - hold home meds on dialysis days, restart as tolerated

## 2025-01-11 NOTE — PROGRESS NOTE ADULT - ATTENDING COMMENTS
I agree with the fellow's findings and plans as written above with the following additions/amendments:    Seen and examined on HD second time, no complaints, continue HD as above
I agree with the fellow's findings and plans as written above with the following additions/amendments:    Seen and examined on HD, tolerating well, continue HD as above
Patient was seen and examined at bedside on 1/11/2025 at 930 am. Patient reports that he feels well. Denies CP, N/V, SOB. ROS is otherwise negative. Vitals, labwork and pertinent imaging reviewed. Physical exam - NAD, AAO x 4, PERRLA, EOMI, supple neck, chest - CTA b/l, + R PermCath, CV - rrr, s1s2, no m/r/g, abd - soft, NTND, + BS, ext - wwp, psych - normal affect, skin - erythema, back - midline, MSK - intact ROM of all joints, psych - normal affect    Plan  -C/w HD patient missed sessions due to the cold  -IS  -Stop abx  -Patient is medically ready for d/c

## 2025-01-11 NOTE — PROGRESS NOTE ADULT - PROBLEM SELECTOR PLAN 7
PAD s/p multiple peripheral interventions with L pop aneurysm repair c/b compartment syndrome with fasciotomy and subsequent LLE bypass (3/19), RLE bypass (4/20)  - c/w home aspirin

## 2025-01-11 NOTE — PROGRESS NOTE ADULT - PROBLEM SELECTOR PLAN 3
CXR with pneumobilia. No recent abdominal surgery.   - Repeat CXR to reassess for pneumobilia -> if persistent get CT A/P for further assessment

## 2025-01-12 VITALS
TEMPERATURE: 98 F | DIASTOLIC BLOOD PRESSURE: 70 MMHG | SYSTOLIC BLOOD PRESSURE: 139 MMHG | RESPIRATION RATE: 16 BRPM | OXYGEN SATURATION: 96 % | HEART RATE: 86 BPM

## 2025-01-12 LAB
FERRITIN SERPL-MCNC: 1432 NG/ML — HIGH (ref 30–400)
IRON SATN MFR SERPL: 26 % — SIGNIFICANT CHANGE UP (ref 16–55)
IRON SATN MFR SERPL: 44 UG/DL — LOW (ref 45–165)
PTH-INTACT FLD-MCNC: 580 PG/ML — HIGH (ref 15–65)
TIBC SERPL-MCNC: 170 UG/DL — LOW (ref 220–430)
UIBC SERPL-MCNC: 126 UG/DL — SIGNIFICANT CHANGE UP (ref 110–370)

## 2025-01-12 PROCEDURE — 71045 X-RAY EXAM CHEST 1 VIEW: CPT

## 2025-01-12 PROCEDURE — 83735 ASSAY OF MAGNESIUM: CPT

## 2025-01-12 PROCEDURE — 86706 HEP B SURFACE ANTIBODY: CPT

## 2025-01-12 PROCEDURE — 87637 SARSCOV2&INF A&B&RSV AMP PRB: CPT

## 2025-01-12 PROCEDURE — 80053 COMPREHEN METABOLIC PANEL: CPT

## 2025-01-12 PROCEDURE — 96375 TX/PRO/DX INJ NEW DRUG ADDON: CPT

## 2025-01-12 PROCEDURE — 71046 X-RAY EXAM CHEST 2 VIEWS: CPT

## 2025-01-12 PROCEDURE — 80048 BASIC METABOLIC PNL TOTAL CA: CPT

## 2025-01-12 PROCEDURE — 87040 BLOOD CULTURE FOR BACTERIA: CPT

## 2025-01-12 PROCEDURE — 82330 ASSAY OF CALCIUM: CPT

## 2025-01-12 PROCEDURE — 99291 CRITICAL CARE FIRST HOUR: CPT

## 2025-01-12 PROCEDURE — 85025 COMPLETE CBC W/AUTO DIFF WBC: CPT

## 2025-01-12 PROCEDURE — 83036 HEMOGLOBIN GLYCOSYLATED A1C: CPT

## 2025-01-12 PROCEDURE — 82962 GLUCOSE BLOOD TEST: CPT

## 2025-01-12 PROCEDURE — 93005 ELECTROCARDIOGRAM TRACING: CPT

## 2025-01-12 PROCEDURE — 97161 PT EVAL LOW COMPLEX 20 MIN: CPT

## 2025-01-12 PROCEDURE — 87340 HEPATITIS B SURFACE AG IA: CPT

## 2025-01-12 PROCEDURE — 84132 ASSAY OF SERUM POTASSIUM: CPT

## 2025-01-12 PROCEDURE — 99239 HOSP IP/OBS DSCHRG MGMT >30: CPT | Mod: GC

## 2025-01-12 PROCEDURE — 74177 CT ABD & PELVIS W/CONTRAST: CPT | Mod: MC

## 2025-01-12 PROCEDURE — 0225U NFCT DS DNA&RNA 21 SARSCOV2: CPT

## 2025-01-12 PROCEDURE — 84100 ASSAY OF PHOSPHORUS: CPT

## 2025-01-12 PROCEDURE — 94640 AIRWAY INHALATION TREATMENT: CPT

## 2025-01-12 PROCEDURE — 96374 THER/PROPH/DIAG INJ IV PUSH: CPT

## 2025-01-12 PROCEDURE — 86803 HEPATITIS C AB TEST: CPT

## 2025-01-12 PROCEDURE — 70450 CT HEAD/BRAIN W/O DYE: CPT | Mod: MC

## 2025-01-12 PROCEDURE — 84295 ASSAY OF SERUM SODIUM: CPT

## 2025-01-12 PROCEDURE — 36415 COLL VENOUS BLD VENIPUNCTURE: CPT

## 2025-01-12 PROCEDURE — 83605 ASSAY OF LACTIC ACID: CPT

## 2025-01-12 PROCEDURE — 82803 BLOOD GASES ANY COMBINATION: CPT

## 2025-01-12 PROCEDURE — 90935 HEMODIALYSIS ONE EVALUATION: CPT

## 2025-01-12 RX ADMIN — PANTOPRAZOLE 20 MILLIGRAM(S): 40 TABLET, DELAYED RELEASE ORAL at 06:35

## 2025-01-12 RX ADMIN — APIXABAN 5 MILLIGRAM(S): 5 TABLET, FILM COATED ORAL at 06:35

## 2025-01-12 RX ADMIN — SEVELAMER CARBONATE 800 MILLIGRAM(S): 800 TABLET, FILM COATED ORAL at 12:10

## 2025-01-12 RX ADMIN — Medication 81 MILLIGRAM(S): at 12:10

## 2025-01-12 RX ADMIN — SEVELAMER CARBONATE 800 MILLIGRAM(S): 800 TABLET, FILM COATED ORAL at 08:57

## 2025-01-12 NOTE — DISCHARGE NOTE PROVIDER - NSDCFUSCHEDAPPT_GEN_ALL_CORE_FT
[facility]  Scheduled Appointment: 01/14/2025     [facility]  Scheduled Appointment: 01/14/2025    [facility]  INTMED [address]  Scheduled Appointment: 01/17/2025

## 2025-01-12 NOTE — DISCHARGE NOTE PROVIDER - PROVIDER TOKENS
PROVIDER:[TOKEN:[[id number]]] PROVIDER:[TOKEN:[[id number]],SCHEDULEDAPPT:[01/14/2025]],PROVIDER:[TOKEN:[[id number]],SCHEDULEDAPPT:[01/17/2025],SCHEDULEDAPPTTIME:[02:30 PM]]

## 2025-01-12 NOTE — DISCHARGE NOTE PROVIDER - NSDCFUADDAPPT_GEN_ALL_CORE_FT
(1) Please follow up with [facility] with Dr. Felipe Morales on behalf of Dr. Jerardo Perry at [address] [place] [address], [address], [place] [address] on 1/17/2025 at 2:30pm.    Appointment was scheduled by Ms. LAKESHA Davis, Referral Coordinator.

## 2025-01-12 NOTE — DISCHARGE NOTE PROVIDER - HOSPITAL COURSE
78 yo M with PMHx CAD s/p CABG x4 (2007), PCI to OM1 2/22, HFrEF (12/23 EF 30%), PAD s/p multiple peripheral interventions with L pop aneurysm repair c/b compartment syndrome with fasciotomy and subsequent LLE bypass (3/19), RLE bypass (4/20), likely undiagnosed clotting disorder on eliquis, ESRD on HD TTS via perm cath R chest, HTN, DM, BPH who presented for hyperkalemia during preop for elective L AVF placement with vascular surgery,  admitted to medicine for dialysis. Patient received one session on 1/11 and is now optimized for discharge.       Problem/Plan - 1:  ·  Problem: Hyperkalemia.   ·  Plan: Found to have K 6.5 in pre-op for L AVF with vascular surgery. Received hyperkalemia cocktail in ED, now getting session of HD. Patient reports missing last 2 sessions of HD due to cold weather, which is likely reason for hyperkalemia. EKG without signs of hyperkalemia  - f/u post-dialysis labs  - HD session yesterday and today.    Problem/Plan - 2:  ·  Problem: History of recent fall.   ·  Plan: Patient reports falling after getting off bus after dialysis last Saturday and hit head. Has bruising to Left forehead and eye. Currently not in pain. CT head with no evidence of acute intracranial pathology.  - Tylenol 650mg Q6hrs as needed for pain   - PT consult, recommended outpatient PT.    Problem/Plan - 3:  ·  Problem: ESRD on dialysis.   ·  Plan: ESRD on dialysis TThSat. Has R chest permacath, planning for L AV fistula.   - s/p HD on 1/11  - outpatient rescheduling with vascular surgery for AVF of L arm       Problem/Plan - 4:  ·  Problem: CAD (coronary artery disease).   ·  Plan: PMHx CAD s/p CABG x4 (2007), PCI to OM1 2/22. Home med: aspirin, atorvastatin 80mg, ezetimibe.  - c/w home meds.    Problem/Plan - 5:  ·  Problem: PAD (peripheral artery disease).   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    Problem/Plan - 6:  ·  Problem: History of blood clotting disorder.   ·  Plan: Home med: Eliquis 5mg BID  - c/w home med.    Problem/Plan - 7:  ·  Problem: Chronic HFrEF (heart failure with reduced ejection fraction).   ·  Plan: HFrEF (12/23 EF 30%). Home med: Carvelilol 6.25mg BID, Entresto 24-26mg BID  - hold home meds on dialysis days, restart as tolerated.    Problem/Plan - 8:  ·  Problem: HTN (hypertension).   ·  Plan; Home meds: Carvedilol 6.25mg BID and Entresto 24-26 BID.   - holding home med on dialysis days, restart on discharge    Problem/Plan - 9:  ·  Problem: DM (diabetes mellitus).   ·  Plan: Home med: Jardiance. A1c 5.4%  - c/w jardiance.        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 Patient received one session on 1/11 and is now optimized for discharge.       Problem/Plan - 1:  ·  Problem: Hyperkalemia.   ·  Plan: Found to have K 6.5 in pre-op for L AVF with vascular surgery. Received hyperkalemia cocktail in ED, and HD.   Patient reports missing last 2 sessions of HD due to cold weather, which is likely reason for hyperkalemia. EKG without signs of hyperkalemia  - HD session on admission (1/10) and 1/11    Problem/Plan - 2:  ·  Problem: History of recent fall.   ·  Plan: Patient reports falling after getting off bus after dialysis last Saturday and hit head. Has bruising to Left forehead and eye. Currently not in pain. CT head with no evidence of acute intracranial pathology.  - Tylenol 650mg Q6hrs as needed for pain   - PT consult, recommended outpatient PT.    Problem/Plan - 3:  ·  Problem: ESRD on dialysis.   ·  Plan: ESRD on dialysis TThSat. Has R chest permacath, planning for L AV fistula.   - s/p HD on 1/10 & 1/11  - outpatient rescheduling with vascular surgery for AVF of L arm       Problem/Plan - 4:  ·  Problem: CAD (coronary artery disease).   ·  Plan: PMHx CAD s/p CABG x4 (2007), PCI to OM1 2/22. Home med: aspirin, atorvastatin 80mg, ezetimibe.  - c/w home meds.    Problem/Plan - 5:  ·  Problem: PAD (peripheral artery disease).   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    Problem/Plan - 6:  ·  Problem: History of blood clotting disorder.   ·  Plan: Home med: Eliquis 5mg BID  - c/w home med.    Problem/Plan - 7:  ·  Problem: Chronic HFrEF (heart failure with reduced ejection fraction).   ·  Plan: HFrEF (12/23 EF 30%). Home med: Carvelilol 6.25mg BID, Entresto 24-26mg BID  - con. home meds    Problem/Plan - 8:  ·  Problem: HTN (hypertension).   ·  Plan; Home meds: Carvedilol 6.25mg BID and Entresto 24-26 BID.   - cont. on discharge    Problem/Plan - 9:  ·  Problem: DM (diabetes mellitus).   ·  Plan: Home med: Jardiance. A1c 5.4%  - c/w jardiance.        Patient was discharged to: Home  New medications: none  Changes to old medications: none  Medications that were stopped: none  Items to Follow up as outpatient: vascular for AVF  Physical exam at time of discharge:     PHYSICAL EXAM:  General: NAD, AAOx3  HEENT: atraumatic, normocephalic, bruising on forehead  Pulmonary: clear to auscultation bilaterally; No wheeze  Cardiovascular: Regular rate and rhythm; no murmurs, rubs or gallops. Normal S1S2  Gastrointestinal: Soft, nontender, nondistended; bowel sounds present  Musculoskeletal: 2+ peripheral pulses, no clubbing, cyanosis or edema  Neurology: Pt. alert and oriented, fluent speech, able to move all extremities  Skin: no rashes or lesions

## 2025-01-12 NOTE — DISCHARGE NOTE PROVIDER - NSDCCPCAREPLAN_GEN_ALL_CORE_FT
PRINCIPAL DISCHARGE DIAGNOSIS  Diagnosis: Acute hyperkalemia  Assessment and Plan of Treatment: Hyperkalemia is the medical term for a potassium level in the blood that's higher than is healthy. Potassium is a chemical that nerve and muscle cells need to work. This includes the nerve and muscle cells of the heart. The kidneys help control the amount of potassium in the blood.  A healthy blood potassium level is 3.6 to 5.2 millimoles per liter (mmol/L). Having a blood potassium level higher than 6.0 mmol/L can be a danger. Your potassium was elevated due to your kidney disfunction. It is important to get routine dialysis to prevent high potassium.     PRINCIPAL DISCHARGE DIAGNOSIS  Diagnosis: Acute hyperkalemia  Assessment and Plan of Treatment: Hyperkalemia is the medical term for a potassium level in the blood that's higher than is healthy. Potassium is a chemical that nerve and muscle cells need to work. This includes the nerve and muscle cells of the heart. The kidneys help control the amount of potassium in the blood.  A healthy blood potassium level is 3.6 to 5.2 millimoles per liter (mmol/L). Having a blood potassium level higher than 6.0 mmol/L can be a danger. Your potassium was elevated due to your kidney disfunction. It is important to get routine dialysis to prevent high potassium.      SECONDARY DISCHARGE DIAGNOSES  Diagnosis: Chronic HFrEF (heart failure with reduced ejection fraction)  Assessment and Plan of Treatment: 12/2023 - 30%    Diagnosis: ESRD on dialysis  Assessment and Plan of Treatment:     Diagnosis: PAD (peripheral artery disease)  Assessment and Plan of Treatment:     Diagnosis: CAD (coronary artery disease)  Assessment and Plan of Treatment:

## 2025-01-12 NOTE — DISCHARGE NOTE PROVIDER - ATTENDING DISCHARGE PHYSICAL EXAMINATION:
Patient was seen and examined at bedside on 1/12/2025 at 930 am. Patient reports that he feels well. Denies CP, N/V, SOB. ROS is otherwise negative. Vitals, labwork and pertinent imaging reviewed. Physical exam - NAD, AAO x 4, PERRLA, EOMI, supple neck, chest - CTA b/l, + R PermCath, CV - rrr, s1s2, no m/r/g, abd - soft, NTND, + BS, ext - wwp, psych - normal affect, skin - erythema, back - midline, MSK - intact ROM of all joints, psych - normal affect    Plan  -C/w HD; patient missed sessions due to the cold  -IS  -PT/OT - rec outpatient PT  -Patient is medically ready for d/c

## 2025-01-12 NOTE — DISCHARGE NOTE NURSING/CASE MANAGEMENT/SOCIAL WORK - PATIENT PORTAL LINK FT
You can access the FollowMyHealth Patient Portal offered by [facility] by registering at the following website: [email or web address]. By joining [facility]’s FollowMyHealth portal, you will also be able to view your health information using other applications (apps) compatible with our system.

## 2025-01-12 NOTE — DISCHARGE NOTE PROVIDER - CARE PROVIDERS DIRECT ADDRESSES
[email or web address]

## 2025-01-12 NOTE — DISCHARGE NOTE PROVIDER - CARE PROVIDER_API CALL
Ed Quesada  Vascular Surgery  [address] [address]  Phone: [phone]  Fax: [phone]  Follow Up Time:    Ed Quesada  Vascular Surgery  [address] [address]  Phone: [phone]  Fax: [phone]  Scheduled Appointment: 01/14/2025    Jerardo Perry)  Internal Medicine  [address]  [place] [address]  Phone: [phone]  Fax: [phone]  Scheduled Appointment: 01/17/2025 02:30 PM

## 2025-01-12 NOTE — DISCHARGE NOTE PROVIDER - NSDCMRMEDTOKEN_GEN_ALL_CORE_FT
aspirin 81 mg oral delayed release tablet: 1 tab(s) orally once a day  atorvastatin 80 mg oral tablet: 1 tab(s) orally once a day (at bedtime)  carvedilol 6.25 mg oral tablet: 1 tab(s) orally 2 times a day  Eliquis 5 mg oral tablet: 1 tab(s) orally 2 times a day  Entresto 24 mg-26 mg oral tablet: 1 tab(s) orally 2 times a day  ezetimibe 10 mg oral tablet: 1 tab(s) orally once a day  Jardiance 10 mg oral tablet: 1 tab(s) orally once a day  pantoprazole 20 mg oral delayed release tablet: 1 tab(s) orally once a day  Renal Vitamin oral tablet: 1 tab(s) orally once a day

## 2025-01-12 NOTE — DISCHARGE NOTE NURSING/CASE MANAGEMENT/SOCIAL WORK - FINANCIAL ASSISTANCE
[facility] provides services at a reduced cost to those who are determined to be eligible through [facility]’s financial assistance program. Information regarding [facility]’s financial assistance program can be found by going to [email or web address] or by calling [phone].

## 2025-01-13 PROBLEM — Z91.81 HISTORY OF FALLING: Chronic | Status: ACTIVE | Noted: 2025-01-09

## 2025-01-13 PROBLEM — N18.6 END STAGE RENAL DISEASE: Chronic | Status: ACTIVE | Noted: 2024-12-05

## 2025-01-14 PROBLEM — Z86.2 PERSONAL HISTORY OF DISEASES OF THE BLOOD AND BLOOD-FORMING ORGANS AND CERTAIN DISORDERS INVOLVING THE IMMUNE MECHANISM: Chronic | Status: ACTIVE | Noted: 2024-12-05

## 2025-01-15 LAB
CULTURE RESULTS: SIGNIFICANT CHANGE UP
CULTURE RESULTS: SIGNIFICANT CHANGE UP
SPECIMEN SOURCE: SIGNIFICANT CHANGE UP
SPECIMEN SOURCE: SIGNIFICANT CHANGE UP

## 2025-01-17 ENCOUNTER — APPOINTMENT (OUTPATIENT)
Dept: INTERNAL MEDICINE | Facility: CLINIC | Age: 80
End: 2025-01-17

## 2025-01-23 ENCOUNTER — INPATIENT (INPATIENT)
Facility: HOSPITAL | Age: 80
LOS: 1 days | Discharge: LEFT BEFORE DISCHARGE | DRG: 640 | End: 2025-01-25
Attending: HOSPITALIST | Admitting: HOSPITALIST
Payer: MEDICARE

## 2025-01-23 VITALS
SYSTOLIC BLOOD PRESSURE: 179 MMHG | HEART RATE: 102 BPM | WEIGHT: 173.94 LBS | HEIGHT: 78 IN | DIASTOLIC BLOOD PRESSURE: 95 MMHG | RESPIRATION RATE: 20 BRPM | TEMPERATURE: 98 F | OXYGEN SATURATION: 99 %

## 2025-01-23 DIAGNOSIS — S00.83XA CONTUSION OF OTHER PART OF HEAD, INITIAL ENCOUNTER: ICD-10-CM

## 2025-01-23 DIAGNOSIS — K83.8 OTHER SPECIFIED DISEASES OF BILIARY TRACT: ICD-10-CM

## 2025-01-23 DIAGNOSIS — Z91.158 PATIENT'S NONCOMPLIANCE WITH RENAL DIALYSIS FOR OTHER REASON: ICD-10-CM

## 2025-01-23 DIAGNOSIS — D63.1 ANEMIA IN CHRONIC KIDNEY DISEASE: ICD-10-CM

## 2025-01-23 DIAGNOSIS — E11.51 TYPE 2 DIABETES MELLITUS WITH DIABETIC PERIPHERAL ANGIOPATHY WITHOUT GANGRENE: ICD-10-CM

## 2025-01-23 DIAGNOSIS — Z79.82 LONG TERM (CURRENT) USE OF ASPIRIN: ICD-10-CM

## 2025-01-23 DIAGNOSIS — E87.5 HYPERKALEMIA: ICD-10-CM

## 2025-01-23 DIAGNOSIS — I45.10 UNSPECIFIED RIGHT BUNDLE-BRANCH BLOCK: ICD-10-CM

## 2025-01-23 DIAGNOSIS — Z99.2 DEPENDENCE ON RENAL DIALYSIS: ICD-10-CM

## 2025-01-23 DIAGNOSIS — Z79.899 OTHER LONG TERM (CURRENT) DRUG THERAPY: ICD-10-CM

## 2025-01-23 DIAGNOSIS — I13.2 HYPERTENSIVE HEART AND CHRONIC KIDNEY DISEASE WITH HEART FAILURE AND WITH STAGE 5 CHRONIC KIDNEY DISEASE, OR END STAGE RENAL DISEASE: ICD-10-CM

## 2025-01-23 DIAGNOSIS — Z79.01 LONG TERM (CURRENT) USE OF ANTICOAGULANTS: ICD-10-CM

## 2025-01-23 DIAGNOSIS — H26.9 UNSPECIFIED CATARACT: Chronic | ICD-10-CM

## 2025-01-23 DIAGNOSIS — T50.3X6A UNDERDOSING OF ELECTROLYTIC, CALORIC AND WATER-BALANCE AGENTS, INITIAL ENCOUNTER: ICD-10-CM

## 2025-01-23 DIAGNOSIS — Z79.84 LONG TERM (CURRENT) USE OF ORAL HYPOGLYCEMIC DRUGS: ICD-10-CM
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
## 2025-01-23 DIAGNOSIS — Z95.1 PRESENCE OF AORTOCORONARY BYPASS GRAFT: ICD-10-CM

## 2025-01-23 DIAGNOSIS — E87.70 FLUID OVERLOAD, UNSPECIFIED: ICD-10-CM

## 2025-01-23 DIAGNOSIS — N18.6 END STAGE RENAL DISEASE: ICD-10-CM

## 2025-01-23 DIAGNOSIS — I72.4 ANEURYSM OF ARTERY OF LOWER EXTREMITY: Chronic | ICD-10-CM

## 2025-01-23 DIAGNOSIS — J18.9 PNEUMONIA, UNSPECIFIED ORGANISM: ICD-10-CM

## 2025-01-23 DIAGNOSIS — Z86.2 PERSONAL HISTORY OF DISEASES OF THE BLOOD AND BLOOD-FORMING ORGANS AND CERTAIN DISORDERS INVOLVING THE IMMUNE MECHANISM: ICD-10-CM
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
## 2025-01-23 DIAGNOSIS — N40.0 BENIGN PROSTATIC HYPERPLASIA WITHOUT LOWER URINARY TRACT SYMPTOMS: ICD-10-CM

## 2025-01-23 DIAGNOSIS — I50.22 CHRONIC SYSTOLIC (CONGESTIVE) HEART FAILURE: ICD-10-CM

## 2025-01-23 DIAGNOSIS — Z95.828 PRESENCE OF OTHER VASCULAR IMPLANTS AND GRAFTS: ICD-10-CM

## 2025-01-23 DIAGNOSIS — N25.81 SECONDARY HYPERPARATHYROIDISM OF RENAL ORIGIN: ICD-10-CM

## 2025-01-23 DIAGNOSIS — Z95.1 PRESENCE OF AORTOCORONARY BYPASS GRAFT: Chronic | ICD-10-CM

## 2025-01-23 DIAGNOSIS — I25.10 ATHEROSCLEROTIC HEART DISEASE OF NATIVE CORONARY ARTERY WITHOUT ANGINA PECTORIS: ICD-10-CM

## 2025-01-23 DIAGNOSIS — Z95.828 PRESENCE OF OTHER VASCULAR IMPLANTS AND GRAFTS: Chronic | ICD-10-CM

## 2025-01-23 DIAGNOSIS — E11.22 TYPE 2 DIABETES MELLITUS WITH DIABETIC CHRONIC KIDNEY DISEASE: ICD-10-CM

## 2025-01-23 LAB
ADD ON TEST-SPECIMEN IN LAB: SIGNIFICANT CHANGE UP
ANION GAP SERPL CALC-SCNC: 19 MMOL/L — HIGH (ref 5–17)
BASOPHILS # BLD AUTO: 0.08 K/UL — SIGNIFICANT CHANGE UP (ref 0–0.2)
BASOPHILS NFR BLD AUTO: 0.8 % — SIGNIFICANT CHANGE UP (ref 0–2)
BLD GP AB SCN SERPL QL: NEGATIVE — SIGNIFICANT CHANGE UP
BUN SERPL-MCNC: 97 MG/DL — HIGH (ref 7–23)
CALCIUM SERPL-MCNC: 8.7 MG/DL — SIGNIFICANT CHANGE UP (ref 8.4–10.5)
CHLORIDE SERPL-SCNC: 99 MMOL/L — SIGNIFICANT CHANGE UP (ref 96–108)
CO2 SERPL-SCNC: 16 MMOL/L — LOW (ref 22–31)
CREAT SERPL-MCNC: 10.23 MG/DL — HIGH (ref 0.5–1.3)
EGFR: 5 ML/MIN/1.73M2 — LOW
EOSINOPHIL # BLD AUTO: 0.16 K/UL — SIGNIFICANT CHANGE UP (ref 0–0.5)
EOSINOPHIL NFR BLD AUTO: 1.6 % — SIGNIFICANT CHANGE UP (ref 0–6)
FLUAV AG NPH QL: SIGNIFICANT CHANGE UP
FLUBV AG NPH QL: SIGNIFICANT CHANGE UP
GAS PNL BLDV: SIGNIFICANT CHANGE UP
GLUCOSE SERPL-MCNC: 111 MG/DL — HIGH (ref 70–99)
HCT VFR BLD CALC: 25.6 % — LOW (ref 39–50)
HGB BLD-MCNC: 7.6 G/DL — LOW (ref 13–17)
IMM GRANULOCYTES NFR BLD AUTO: 1.1 % — HIGH (ref 0–0.9)
LACTATE SERPL-SCNC: 0.9 MMOL/L — SIGNIFICANT CHANGE UP (ref 0.5–2)
LYMPHOCYTES # BLD AUTO: 1.28 K/UL — SIGNIFICANT CHANGE UP (ref 1–3.3)
LYMPHOCYTES # BLD AUTO: 12.6 % — LOW (ref 13–44)
MCHC RBC-ENTMCNC: 28.3 PG — SIGNIFICANT CHANGE UP (ref 27–34)
MCHC RBC-ENTMCNC: 29.7 G/DL — LOW (ref 32–36)
MCV RBC AUTO: 95.2 FL — SIGNIFICANT CHANGE UP (ref 80–100)
MONOCYTES # BLD AUTO: 0.18 K/UL — SIGNIFICANT CHANGE UP (ref 0–0.9)
MONOCYTES NFR BLD AUTO: 1.8 % — LOW (ref 2–14)
NEUTROPHILS # BLD AUTO: 8.36 K/UL — HIGH (ref 1.8–7.4)
NEUTROPHILS NFR BLD AUTO: 82.1 % — HIGH (ref 43–77)
NRBC # BLD: 0 /100 WBCS — SIGNIFICANT CHANGE UP (ref 0–0)
NRBC BLD-RTO: 0 /100 WBCS — SIGNIFICANT CHANGE UP (ref 0–0)
NT-PROBNP SERPL-SCNC: HIGH PG/ML (ref 0–300)
OB PNL STL: POSITIVE
PLATELET # BLD AUTO: 251 K/UL — SIGNIFICANT CHANGE UP (ref 150–400)
POTASSIUM SERPL-MCNC: 5.8 MMOL/L — HIGH (ref 3.5–5.3)
POTASSIUM SERPL-SCNC: 5.8 MMOL/L — HIGH (ref 3.5–5.3)
RBC # BLD: 2.69 M/UL — LOW (ref 4.2–5.8)
RBC # FLD: 16 % — HIGH (ref 10.3–14.5)
RH IG SCN BLD-IMP: POSITIVE — SIGNIFICANT CHANGE UP
RH IG SCN BLD-IMP: POSITIVE — SIGNIFICANT CHANGE UP
RSV RNA NPH QL NAA+NON-PROBE: SIGNIFICANT CHANGE UP
SARS-COV-2 RNA SPEC QL NAA+PROBE: SIGNIFICANT CHANGE UP
SODIUM SERPL-SCNC: 134 MMOL/L — LOW (ref 135–145)
WBC # BLD: 10.17 K/UL — SIGNIFICANT CHANGE UP (ref 3.8–10.5)
WBC # FLD AUTO: 10.17 K/UL — SIGNIFICANT CHANGE UP (ref 3.8–10.5)

## 2025-01-23 PROCEDURE — 71045 X-RAY EXAM CHEST 1 VIEW: CPT | Mod: 26

## 2025-01-23 PROCEDURE — 99291 CRITICAL CARE FIRST HOUR: CPT

## 2025-01-23 RX ORDER — IPRATROPIUM BROMIDE AND ALBUTEROL SULFATE .5; 2.5 MG/3ML; MG/3ML
3 SOLUTION RESPIRATORY (INHALATION) ONCE
Refills: 0 | Status: COMPLETED | OUTPATIENT
Start: 2025-01-23 | End: 2025-01-23

## 2025-01-23 RX ORDER — ANTISEPTIC SURGICAL SCRUB 0.04 MG/ML
1 SOLUTION TOPICAL DAILY
Refills: 0 | Status: DISCONTINUED | OUTPATIENT
Start: 2025-01-23 | End: 2025-01-25

## 2025-01-23 RX ORDER — SODIUM ZIRCONIUM CYCLOSILICATE 5 G/5G
10 POWDER, FOR SUSPENSION ORAL ONCE
Refills: 0 | Status: COMPLETED | OUTPATIENT
Start: 2025-01-23 | End: 2025-01-23

## 2025-01-23 RX ORDER — EPOETIN ALFA 2000 [IU]/ML
8000 SOLUTION INTRAVENOUS; SUBCUTANEOUS ONCE
Refills: 0 | Status: COMPLETED | OUTPATIENT
Start: 2025-01-23 | End: 2025-01-23

## 2025-01-23 RX ADMIN — Medication 200 GRAM(S): at 17:19

## 2025-01-23 RX ADMIN — IPRATROPIUM BROMIDE AND ALBUTEROL SULFATE 3 MILLILITER(S): .5; 2.5 SOLUTION RESPIRATORY (INHALATION) at 17:49

## 2025-01-23 RX ADMIN — Medication 100 MILLIGRAM(S): at 17:49

## 2025-01-23 RX ADMIN — Medication 2 GRAM(S): at 18:55

## 2025-01-23 RX ADMIN — EPOETIN ALFA 8000 UNIT(S): 2000 SOLUTION INTRAVENOUS; SUBCUTANEOUS at 22:20

## 2025-01-23 RX ADMIN — Medication 40 MILLIGRAM(S): at 17:49

## 2025-01-23 RX ADMIN — SODIUM ZIRCONIUM CYCLOSILICATE 10 GRAM(S): 5 POWDER, FOR SUSPENSION ORAL at 17:50

## 2025-01-23 NOTE — ED PROVIDER NOTE - PROGRESS NOTE DETAILS
Renal seeing pt. Spoke directly w/ Dr Winston - for monitored bed HD. Needs MICU for tele bed. MICU aware Pt for urgent HD. admit to tele, Dr Franco

## 2025-01-23 NOTE — CONSULT NOTE ADULT - ASSESSMENT
ESRD on HD TTS.     - Plan for urgent HD today  - outpatient rescheduling with vascular surgery for AVF of L arm   - f/u nephro recs.    CAD s/p CABG x4 (2007), PCI to OM1 2/22. Home med: aspirin, atorvastatin 80mg, ezetimibe.  - c/w home meds.   #ESRD on HD TTS - Missed HD sessions   Undergoes HD via right chest TDC. He missed dialysis x2 due to body aches, last dialyzed Saturday 1/18/25.   - Plan for urgent HD today  - f/u nephro recs.  - Plan for AVF of L arm as per nephro/vascular surgery    #Shortness of breath   CXR with inc pulmonary vascular congestion and pro-BNP 35232 iso missed HD sessions suggestive of volume overload   - Urgent HD as above     #Cough   Likely chronic possibly secondary to undiagnosed COPD given long standing smoking hx. Pt states it is unchanged from discharge.   Don't suspect PNA as pt was recently treated and CXR c/w volume overload. Doesn't meet SIRS/sepsis criteria.  - Recommend duonebs PRN   - Recommend Tessalon perles for symptomatic management of cough   - Can consider adding on pro-vikram   - Full RVP, urine strep and legionella    #CAD s/p CABG x4 (2007), PCI to OM1 2/22. Home med: aspirin, atorvastatin 80mg, ezetimibe.  - c/w home meds.     #PAD s/p multiple peripheral interventions with L pop aneurysm repair c/b compartment syndrome with fasciotomy and subsequent LLE bypass (3/19), RLE bypass (4/20)  - hold home aspirin given concern for melena     #HTN Carvedilol 6.25mg BID and Entresto 24-26 BID.   - Hold BP meds before each dialysis session, can resume after HD    #DM   Home med: Jardiance. A1c 5.4%  - c/w home Jardiance     #Anemia  Iron studies 1/12: iron 44, TIBC 170, ferritin 1432.   c/w anemia of chronic disease iso ESRD.   - ctm H&H     #Diarrhea  No fever, leukocytosis. Abdominal exam w/o TTP, distention, peritoneal signs. Low suspicion for infectious diarrhea. However possible concern for melena given color and +fobt.   - Consider GI PCR if diarrhea persists  - Consider GI consult if melena persists   - Monitor stool count     Preventative:  F: None   E: Replete as necessary K>4 Mg>2  N: DASH/Renal diet   DVT Prophylaxis: HOLD home Eliquis   GI prophylaxis: None   CODE STATUS: FULL CODE  Dispo:  [facility]    #ESRD on HD TTS - Missed HD sessions   Undergoes HD via right chest TDC. He missed dialysis x2 due to body aches, last dialyzed Saturday 1/18/25.   - Plan for urgent HD today  - f/u nephro recs.  - Plan for AVF of L arm as per nephro/vascular surgery    #Shortness of breath   CXR with inc pulmonary vascular congestion and pro-BNP 25382 iso missed HD sessions suggestive of volume overload. Pt also reports he's been getting short of breath while at his HD sessions and requiring supplemental O2 intermittently at the dialysis center   - Urgent HD as above   - Maintain O2 sat > 89%    #Cough   Likely chronic possibly secondary to undiagnosed COPD given long standing smoking hx. Pt states it is unchanged from discharge.   Don't suspect PNA as pt was recently treated and CXR c/w volume overload. Doesn't meet SIRS/sepsis criteria.  - Recommend duonebs PRN   - Recommend Tessalon perles for symptomatic management of cough   - Can consider adding on pro-vikram   - Full RVP, urine strep and legionella    #CAD s/p CABG x4 (2007), PCI to OM1 2/22. Home med: aspirin, atorvastatin 80mg, ezetimibe.  - c/w home meds.     #PAD s/p multiple peripheral interventions with L pop aneurysm repair c/b compartment syndrome with fasciotomy and subsequent LLE bypass (3/19), RLE bypass (4/20)  - hold home aspirin given concern for melena     #HTN Carvedilol 6.25mg BID and Entresto 24-26 BID.   - Hold BP meds before each dialysis session, can resume after HD    #DM   Home med: Jardiance. A1c 5.4%  - c/w home Jardiance     #Anemia  Iron studies 1/12: iron 44, TIBC 170, ferritin 1432.   c/w anemia of chronic disease iso ESRD.   - ctm H&H     #Diarrhea  No fever, leukocytosis. Abdominal exam w/o TTP, distention, peritoneal signs. Low suspicion for infectious diarrhea. However possible concern for melena given color and +fobt.   - Consider GI PCR if diarrhea persists  - Consider GI consult if melena persists   - Monitor stool count     Preventative:  F: None   E: Replete as necessary K>4 Mg>2  N: DASH/Renal diet   DVT Prophylaxis: HOLD home Eliquis   GI prophylaxis: None   CODE STATUS: FULL CODE  Dispo: 7 [facility]    #ESRD on HD TTS - Missed HD sessions   Undergoes HD via right chest TDC. He missed dialysis x2 due to body aches, last dialyzed Saturday 1/18/25.   - Plan for urgent HD today  - f/u nephro recs.  - Plan for AVF of L arm as per nephro/vascular surgery    #Shortness of breath   CXR with inc pulmonary vascular congestion and pro-BNP 16499 iso missed HD sessions suggestive of volume overload. Pt also reports he's been getting short of breath while at his HD sessions and requiring supplemental O2 intermittently at the dialysis center   - Urgent HD as above   - Maintain O2 sat > 89%    #Cough   Likely chronic possibly secondary to undiagnosed COPD given long standing smoking hx. Pt states it is unchanged from discharge.   Don't suspect PNA as pt was recently treated and CXR c/w volume overload. Doesn't meet SIRS/sepsis criteria.  - Recommend duonebs PRN   - Recommend Tessalon perles for symptomatic management of cough   - Can consider adding on pro-vikram   - Full RVP, urine strep and legionella    #CAD s/p CABG x4 (2007), PCI to OM1 2/22. Home med: aspirin, atorvastatin 80mg, ezetimibe.  - c/w home atorvastatin 80mg, ezetimibe.  - hold home aspirin given concern for melena     #PAD s/p multiple peripheral interventions with L pop aneurysm repair c/b compartment syndrome with fasciotomy and subsequent LLE bypass (3/19), RLE bypass (4/20)  - hold home aspirin given concern for melena     #HTN Carvedilol 6.25mg BID and Entresto 24-26 BID.   - Hold BP meds before each dialysis session, can resume after HD    #DM   Home med: Jardiance. A1c 5.4%  - c/w home Jardiance     #Anemia  Iron studies 1/12: iron 44, TIBC 170, ferritin 1432.   c/w anemia of chronic disease iso ESRD.   - ctm H&H     #Diarrhea  No fever, leukocytosis. Abdominal exam w/o TTP, distention, peritoneal signs. Low suspicion for infectious diarrhea. However possible concern for melena given color and +fobt.   - Consider GI PCR if diarrhea persists  - Consider GI consult if melena persists   - Monitor stool count     Preventative:  F: None   E: Replete as necessary K>4 Mg>2  N: DASH/Renal diet   DVT Prophylaxis: HOLD home Eliquis   GI prophylaxis: None   CODE STATUS: FULL CODE  Dispo: [facility]

## 2025-01-23 NOTE — ED PROVIDER NOTE - CLINICAL SUMMARY MEDICAL DECISION MAKING FREE TEXT BOX
Pt p/w SOB, cough, myalgias, black stools, missed HD. Tachypneic and intermittently w/ inc WOB and hypoxia, mostly surrounding coughing fits. EKG w/ mildly peaked T's and hyperkalemia. Ca, Lokelma and Lasix (still makes urine). Acidotic on VBG, although c/w prior levels, +uremia. Renal consulted for HD. Pt to be dialyzed on monitored bed. Mini-ICU consult for monitored bed HD.

## 2025-01-23 NOTE — ED PROVIDER NOTE - PHYSICAL EXAMINATION
Constitutional: Well appearing, awake, alert, oriented to person, place, time/situation and intermittently SOB  ENMT: Airway patent. Normal MM  Eyes: Clear bilaterally  Cardiac: Normal rate, regular rhythm.  Heart sounds S1, S2.  No murmurs, rubs or gallops.  Respiratory: Breaths sounds equal and clear b/l. No appreciable W/R/R>. Intermittently tachypnea w/ mild inc WOB and int desat /w coughing. No  accessory mm use.   Gastrointestinal: Abd soft, NT, ND, NABS. No guarding, rebound, or rigidity. No pulsatile abdominal masses.   Musculoskeletal: Range of motion is not limited. non pitting LE edema  Neuro: Alert and oriented x 3, face symmetric and speech fluent. Strength 5/5 x 4 ext and symmetric, nml gross motor movement, nml gait. No focal deficits noted.  Skin: Skin normal color for race, warm, dry and intact. No evidence of rash.  Psych: Alert and oriented to person, place, time/situation. normal mood and affect. no apparent risk to self or others. Constitutional: Well appearing, awake, alert, oriented to person, place, time/situation and intermittently SOB  ENMT: Airway patent. Normal MM  Eyes: Clear bilaterally  Cardiac: Normal rate, regular rhythm.  Heart sounds S1, S2.  No murmurs, rubs or gallops.  Respiratory: Breaths sounds equal and clear b/l, mildly diminished No appreciable W/R/R. + frequent coughing, Intermittently tachypnea w/ mild inc WOB and int desat w/ coughing. No  accessory mm use.   Gastrointestinal: Abd soft, NT, ND, NABS. No guarding, rebound, or rigidity. No pulsatile abdominal masses.   Rectal: stool color normal  Musculoskeletal: Range of motion is not limited. non pitting LE edema  Neuro: Alert and oriented x 3, face symmetric and speech fluent. Strength 5/5 x 4 ext and symmetric, nml gross motor movement, nml gait. No focal deficits noted.  Skin: Skin normal color for race, warm, dry and intact. No evidence of rash.  Psych: Alert and oriented to person, place, time/situation. normal mood and affect. no apparent risk to self or others.

## 2025-01-23 NOTE — CONSULT NOTE ADULT - SUBJECTIVE AND OBJECTIVE BOX
NEPHROLOGY SERVICE INITIAL CONSULT NOTE    HPI:  80 y/o M  w/ ESRD on HD TTS via right chest TDC,PMHx CAD s/p CABG x4 (), PCI to OM1 , HFrEF ( EF 30%), , missed dialysis x2 due to body aches, last dialyzed 25. He also had diarrhea yesterday (4 loose BM, black in color), shortness of breath and chest pain. He had PNA 6 months ago and completed a 20 day course of antibiotics. He's had a non productive cough since. Denies fever or chills. He had a recent admission to [facility] 1/10/25 for missed HD sessions requiring inpatient HD. Was planned for AVF creation however that was postponed and patient was recommended to reschedule with vascular surgery outpatient for AVF of L arm. Presented to ED with volume overload state, dyspneic, requiring supplemental oxygen, nephrology consulted for HD managemet            REVIEW OF SYSTEMS: Otherwise negative except as specified in HPI  PAST MEDICAL & SURGICAL HISTORY:  HTN (hypertension)      HLD (hyperlipidemia)      CAD (coronary artery disease)      DM (diabetes mellitus)      Chronic HFrEF (heart failure with reduced ejection fraction)  2023 - 30%      PAD (peripheral artery disease)      History of BPH      ESRD on dialysis  T, Thurs, Sat- permacath right chest      History of blood clotting disorder  Undiagnosed- pt denies      Prostate cancer  Denies      History of recent fall  left eye bruised      S/P CABG x 4        Aneurysm of left popliteal artery  Repair      H/O extremity bypass graft  RLE and LLE      Cataract  bilateral        FAMILY HISTORY:    SOCIAL HISTORY:  HOME MEDICATIONS:    Allergies    No Known Allergies    Intolerances        ACTIVE MEDICATIONS:  MEDICATIONS  (STANDING):  epoetin carole-epbx (RETACRIT) Injectable 8000 Unit(s) IV Push once    MEDICATIONS  (PRN):        VITAL SIGNS:  Vital Signs Last 24 Hrs  T(C): 36.7 (2025 19:20), Max: 37 (2025 17:30)  T(F): 98.1 (2025 19:20), Max: 98.6 (2025 17:30)  HR: 89 (2025 19:20) (86 - 102)  BP: 170/77 (2025 19:20) (167/79 - 179/95)  BP(mean): 111 (2025 19:20) (111 - 111)  RR: 18 (2025 19:20) (18 - 20)  SpO2: 97% (2025 19:20) (97% - 100%)    Parameters below as of 2025 19:20  Patient On (Oxygen Delivery Method): room air          PE:  General: Not in acute distress, well-nourished  Neck: No visible mass, No JVD noted  Chest: CTAP b/l, no use of accessory respiratory muscles  Heart: RRR, S1/S2 wnl, no MRG  Abdomen: Soft, nontender, nondistended,  no hepatosplenomegaly  Extremities: No clubbing, cyanosis or edema  Neuro:  Alert, no apparent focal deficits, answer questions appropriately  Access: R Chest TDC    Pertinent labs & Imagin.6    10 )-----------( 251      ( 2025 17:15 )             25.6     01-23    134[L]  |  99  |  97[H]  ----------------------------<  111[H]  5.8[H]   |  16[L]  |  10.23[H]    Ca    8.7      2025 17:15        Urinalysis Basic - ( 2025 17:15 )    Color: x / Appearance: x / SG: x / pH: x  Gluc: 111 mg/dL / Ketone: x  / Bili: x / Urobili: x   Blood: x / Protein: x / Nitrite: x   Leuk Esterase: x / RBC: x / WBC x   Sq Epi: x / Non Sq Epi: x / Bacteria: x          CAPILLARY BLOOD GLUCOSE              RADIOLOGY & ADDITIONAL TESTS: Reviewed.

## 2025-01-23 NOTE — CONSULT NOTE ADULT - SUBJECTIVE AND OBJECTIVE BOX
[facility] SERVICE CONSULTATION NOTE    Consult Reason:     CC:    HPI:  HPI:      ROS:  Otherwise negative, except as specified in HPI.    PMH:    PSH:    FH:    SH:    ALLERGIES:    MEDICATIONS:    VITAL SIGNS:  ICU Vital Signs Last 24 Hrs  T(C): 36.9 (23 Jan 2025 18:03), Max: 37 (23 Jan 2025 17:30)  T(F): 98.5 (23 Jan 2025 18:03), Max: 98.6 (23 Jan 2025 17:30)  HR: 95 (23 Jan 2025 18:03) (95 - 102)  BP: 167/79 (23 Jan 2025 18:03) (167/79 - 179/95)  BP(mean): --  ABP: --  ABP(mean): --  RR: 18 (23 Jan 2025 18:03) (18 - 20)  SpO2: 99% (23 Jan 2025 18:03) (99% - 99%)    O2 Parameters below as of 23 Jan 2025 18:03  Patient On (Oxygen Delivery Method): nasal cannula  O2 Flow (L/min): 2        CAPILLARY BLOOD GLUCOSE          PHYSICAL EXAM:  Constitutional: resting comfortably in bed, NAD  HEENT: NC/AT; PERRL, anicteric sclera; no oropharyngeal erythema or exudates; MMM  Neck: supple, no appreciable JVD  Respiratory: CTA B/L, no W/R/R; respirations appear non-labored, conversive in full sentences  Cardiovascular: +S1/S2, RRR  Gastrointestinal: abdomen soft, NT/ND  Extremities: WWP; no clubbing, cyanosis or edema  Vascular: 2+ radial, femoral, and DP/PT pulses B/L  Dermatologic: skin normal color and turgor; no visible rashes  Neurological:     LABS:                        7.6    10.17 )-----------( 251      ( 23 Jan 2025 17:15 )             25.6     01-23    134[L]  |  99  |  97[H]  ----------------------------<  111[H]  5.8[H]   |  16[L]  |  10.23[H]    Ca    8.7      23 Jan 2025 17:15        Lactate, Blood: 0.9 mmol/L (01-23-25 @ 17:15)        Urinalysis Basic - ( 23 Jan 2025 17:15 )    Color: x / Appearance: x / SG: x / pH: x  Gluc: 111 mg/dL / Ketone: x  / Bili: x / Urobili: x   Blood: x / Protein: x / Nitrite: x   Leuk Esterase: x / RBC: x / WBC x   Sq Epi: x / Non Sq Epi: x / Bacteria: x      Blood Gas Profile w/Lytes - Venous: Performed in Lab (01-23-25 @ 17:20)      EKG: Reviewed.    RADIOLOGY & ADDITIONAL TESTS: Reviewed. [facility] SERVICE CONSULTATION NOTE    Consult reason: HD     HPI: 78 y/o M  w/ ESRD on HD TTS via right chest TDC, missed dialysis x2 due to body aches, last dialyzed Saturday 1/18/25. He also had diarrhea yesterday (4 loose BM, black in color), shortness of breath and chest pain. He had PNA 6 months ago and completed a 20 day course of antibiotics. He's had a non productive cough since. Denies fever or chills. He had a recent admission to [facility] 1/10/25 for missed HD sessions requiring inpatient HD. Was planned for AVF creation however that was postponed and patient was recommended to reschedule with vascular surgery outpatient for AVF of L arm.         ROS:  Otherwise negative, except as specified in HPI.      SH:   Social drinker   Active smoker, 3-4 cigs 20-25 years   Denies IVDU    ALLERGIES: NKA    MEDICATIONS:    VITAL SIGNS:  ICU Vital Signs Last 24 Hrs  T(C): 36.9 (23 Jan 2025 18:03), Max: 37 (23 Jan 2025 17:30)  T(F): 98.5 (23 Jan 2025 18:03), Max: 98.6 (23 Jan 2025 17:30)  HR: 95 (23 Jan 2025 18:03) (95 - 102)  BP: 167/79 (23 Jan 2025 18:03) (167/79 - 179/95)  BP(mean): --  ABP: --  ABP(mean): --  RR: 18 (23 Jan 2025 18:03) (18 - 20)  SpO2: 99% (23 Jan 2025 18:03) (99% - 99%)    O2 Parameters below as of 23 Jan 2025 18:03  Patient On (Oxygen Delivery Method): nasal cannula  O2 Flow (L/min): 2        CAPILLARY BLOOD GLUCOSE          PHYSICAL EXAM:  Constitutional: resting comfortably in bed, NAD  HEENT: NC/AT; PERRL, anicteric sclera; no oropharyngeal erythema or exudates; MMM  Neck: supple, no appreciable JVD  Respiratory: CTA B/L, no W/R/R; respirations appear non-labored, conversive in full sentences  Cardiovascular: +S1/S2, RRR  Gastrointestinal: abdomen soft, NT/ND  Extremities: WWP; no clubbing, cyanosis or edema  Vascular: 2+ radial, femoral, and DP/PT pulses B/L  Dermatologic: skin normal color and turgor; no visible rashes  Neurological:     LABS:                        7.6    10.17 )-----------( 251      ( 23 Jan 2025 17:15 )             25.6     01-23    134[L]  |  99  |  97[H]  ----------------------------<  111[H]  5.8[H]   |  16[L]  |  10.23[H]    Ca    8.7      23 Jan 2025 17:15        Lactate, Blood: 0.9 mmol/L (01-23-25 @ 17:15)        Urinalysis Basic - ( 23 Jan 2025 17:15 )    Color: x / Appearance: x / SG: x / pH: x  Gluc: 111 mg/dL / Ketone: x  / Bili: x / Urobili: x   Blood: x / Protein: x / Nitrite: x   Leuk Esterase: x / RBC: x / WBC x   Sq Epi: x / Non Sq Epi: x / Bacteria: x      Blood Gas Profile w/Lytes - Venous: Performed in Lab (01-23-25 @ 17:20)      EKG: Reviewed.    RADIOLOGY & ADDITIONAL TESTS: Reviewed. [facility] SERVICE CONSULTATION NOTE    Consult reason: HD     HPI: 80 y/o M  w/ ESRD on HD TTS via right chest TDC, missed dialysis x2 due to body aches, last dialyzed Saturday 1/18/25. He also had diarrhea yesterday (4 loose BM, black in color), cough, shortness of breath and chest pain. Denies fever or chills. He had a recent admission to [facility] 1/10/25 for missed HD sessions requiring inpatient HD. Was also treated for RLL PNA with CTX and Azithro. He endorses having a dry cough since having PNA initially 6 months ago.   Was planned for AVF creation however that was postponed and patient was recommended to reschedule with vascular surgery outpatient for AVF of L arm.   VS on admission: T 98.2, , /95, RR 20, SPO2 99% on RA   Labs s/f Hb 7.6, Na 134, K 5.8, bicarb 16, AG 19, BUN 97. Cr 10.23 (elevated from 7), Trop 134, pro-BNP 49164.   VBG with pH 7.19, pCO2 44, pO2 < 33. HCO3 17.   mini RVP negative.   CXR with     ICU consulted for urgent HD.     ROS:  Otherwise negative, except as specified in HPI.      SH:   Social drinker   Active smoker, 3-4 cigs 20-25 years   Denies IVDU    ALLERGIES: NKA    MEDICATIONS:    VITAL SIGNS:  ICU Vital Signs Last 24 Hrs  T(C): 36.9 (23 Jan 2025 18:03), Max: 37 (23 Jan 2025 17:30)  T(F): 98.5 (23 Jan 2025 18:03), Max: 98.6 (23 Jan 2025 17:30)  HR: 95 (23 Jan 2025 18:03) (95 - 102)  BP: 167/79 (23 Jan 2025 18:03) (167/79 - 179/95)  BP(mean): --  ABP: --  ABP(mean): --  RR: 18 (23 Jan 2025 18:03) (18 - 20)  SpO2: 99% (23 Jan 2025 18:03) (99% - 99%)    O2 Parameters below as of 23 Jan 2025 18:03  Patient On (Oxygen Delivery Method): nasal cannula  O2 Flow (L/min): 2        CAPILLARY BLOOD GLUCOSE          PHYSICAL EXAM:  Constitutional: resting comfortably in bed, NAD  HEENT: NC/AT; PERRL, anicteric sclera; no oropharyngeal erythema or exudates; dry MMM   Neck: supple, no appreciable JVD  Respiratory: +wheezing, respirations appear non-labored, conversive in full sentences  Cardiovascular: +S1/S2, RRR  Gastrointestinal: abdomen soft, NT/ND  Extremities: WWP; bilateral pitting edema  Vascular: R chest TDC   Dermatologic: skin normal color and turgor; no visible rashes  Neurological: A&O x4, no focal deficit     LABS:                        7.6    10.17 )-----------( 251      ( 23 Jan 2025 17:15 )             25.6     01-23    134[L]  |  99  |  97[H]  ----------------------------<  111[H]  5.8[H]   |  16[L]  |  10.23[H]    Ca    8.7      23 Jan 2025 17:15        Lactate, Blood: 0.9 mmol/L (01-23-25 @ 17:15)        Urinalysis Basic - ( 23 Jan 2025 17:15 )    Color: x / Appearance: x / SG: x / pH: x  Gluc: 111 mg/dL / Ketone: x  / Bili: x / Urobili: x   Blood: x / Protein: x / Nitrite: x   Leuk Esterase: x / RBC: x / WBC x   Sq Epi: x / Non Sq Epi: x / Bacteria: x      Blood Gas Profile w/Lytes - Venous: Performed in Lab (01-23-25 @ 17:20)      EKG: Reviewed.    RADIOLOGY & ADDITIONAL TESTS: Reviewed. [facility] SERVICE CONSULTATION NOTE    Consult reason: HD     HPI: 78 y/o M  w/ PMHx CAD s/p CABG x4 (2007), PCI to OM1 2/22, HFrEF (12/23 EF 30%), PAD s/p multiple peripheral interventions with L pop aneurysm repair c/b compartment syndrome with fasciotomy and subsequent LLE bypass (3/19), RLE bypass (4/20), undiagnosed clotting disorder? on eliquis, HTN, DM, BPH, ESRD on HD TTS via right chest TDC, missed dialysis x2 due to body aches, last dialyzed Saturday 1/18/25. He also had diarrhea yesterday (4 loose BM, black in color), cough, shortness of breath and chest pain. Denies fever or chills. He had a recent admission to [facility] 1/10/25 for missed HD sessions requiring inpatient HD. Was also treated for RLL PNA with CTX and Azithro. He endorses having a dry cough since having PNA initially 6 months ago.   Was planned for AVF creation however that was postponed and patient was recommended to reschedule with vascular surgery outpatient for AVF of L arm.   VS on admission: T 98.2, , /95, RR 20, SPO2 99% on RA   Labs s/f Hb 7.6, Na 134, K 5.8, bicarb 16, AG 19, BUN 97. Cr 10.23 (elevated from 7), Trop 134, pro-BNP 31246.   VBG with pH 7.19, pCO2 44, pO2 < 33. HCO3 17.   mini RVP negative.   CXR with increased pulmonary vascular congestion.     ICU consulted for urgent HD.     ROS:  Otherwise negative, except as specified in HPI.      SH:   Social drinker   Active smoker, 3-4 cigs 20-25 years   Denies IVDU    ALLERGIES: NKA    MEDICATIONS:    VITAL SIGNS:  ICU Vital Signs Last 24 Hrs  T(C): 36.9 (23 Jan 2025 18:03), Max: 37 (23 Jan 2025 17:30)  T(F): 98.5 (23 Jan 2025 18:03), Max: 98.6 (23 Jan 2025 17:30)  HR: 95 (23 Jan 2025 18:03) (95 - 102)  BP: 167/79 (23 Jan 2025 18:03) (167/79 - 179/95)  BP(mean): --  ABP: --  ABP(mean): --  RR: 18 (23 Jan 2025 18:03) (18 - 20)  SpO2: 99% (23 Jan 2025 18:03) (99% - 99%)    O2 Parameters below as of 23 Jan 2025 18:03  Patient On (Oxygen Delivery Method): nasal cannula  O2 Flow (L/min): 2        CAPILLARY BLOOD GLUCOSE          PHYSICAL EXAM:  Constitutional: resting comfortably in bed, NAD  HEENT: NC/AT; PERRL, anicteric sclera; no oropharyngeal erythema or exudates; dry MMM   Neck: supple, no appreciable JVD  Respiratory: +wheezing, respirations appear non-labored, conversive in full sentences  Cardiovascular: +S1/S2, RRR  Gastrointestinal: abdomen soft, NT/ND  Extremities: WWP; bilateral pitting edema  Vascular: R chest TDC   Dermatologic: skin normal color and turgor; no visible rashes  Neurological: A&O x4, no focal deficit     LABS:                        7.6    10.17 )-----------( 251      ( 23 Jan 2025 17:15 )             25.6     01-23    134[L]  |  99  |  97[H]  ----------------------------<  111[H]  5.8[H]   |  16[L]  |  10.23[H]    Ca    8.7      23 Jan 2025 17:15        Lactate, Blood: 0.9 mmol/L (01-23-25 @ 17:15)        Urinalysis Basic - ( 23 Jan 2025 17:15 )    Color: x / Appearance: x / SG: x / pH: x  Gluc: 111 mg/dL / Ketone: x  / Bili: x / Urobili: x   Blood: x / Protein: x / Nitrite: x   Leuk Esterase: x / RBC: x / WBC x   Sq Epi: x / Non Sq Epi: x / Bacteria: x      Blood Gas Profile w/Lytes - Venous: Performed in Lab (01-23-25 @ 17:20)      EKG: Reviewed.    RADIOLOGY & ADDITIONAL TESTS: Reviewed.

## 2025-01-23 NOTE — ED ADULT TRIAGE NOTE - CHIEF COMPLAINT QUOTE
pt presents to ER c/o shortness of breath, chest pain, palpitations, cough, and diarrhea for the 5 days. states his diarrhea is "black." pt is a tues, thurs, sat dialysis patient. states his last dialysis was last saturday because he was too cold to go on tuesday.

## 2025-01-23 NOTE — ED ADULT NURSE NOTE - OBJECTIVE STATEMENT
Presents to ED with c/o feeling sob, cp, palpitations x 5 days. Notes on T+TH+Sa dialysis schedule but missed last two sessions, last completed session on Saturday. PMH ckd, copd no o2.     On presentation to unit, s/p ambulation, pt observed to be tachypneic with spo2 100%. Repositioned and deep breathing advised with some improvement initally. Continued respiratory distress observed- upgraded appropriately for treatment. On CM, piv patent, labs sent.

## 2025-01-23 NOTE — ED PROVIDER NOTE - OBJECTIVE STATEMENT
Pt w/ PMHx CAD s/p CABG x4 (2007), PCI to OM1 2/22, HFrEF (12/23 EF 30%), PAD s/p multiple peripheral interventions with L pop aneurysm repair c/b compartment syndrome with fasciotomy and subsequent LLE bypass (3/19), RLE bypass (4/20), likely undiagnosed clotting disorder on Eliquis, ESRD on HD TTS via perm cath R chest, HTN, DM, BPH recent admission to [facility] 1/10-1/12 for hyperkalemia during preop for elective L AVF placement with vascular surgery, and s/p HD, now p/w SOB, cough, black stools. He states he has had cough for 7 months, unchanged, wet, but unproductive. No f/c. No hemoptysis. He also reports intermittent L sided CP, sharp. NO abd pain. Stool is watery. +nausea. NO vomiting. Denies hx GIB. States his last HD was Sat because of the way he is feeling Pt w/ PMHx CAD s/p CABG x4 (2007), PCI to OM1 2/22, HFrEF (12/23 EF 30%), PAD s/p multiple peripheral interventions with L pop aneurysm repair c/b compartment syndrome with fasciotomy and subsequent LLE bypass (3/19), RLE bypass (4/20), likely undiagnosed clotting disorder on Eliquis, ESRD on HD TTS via perm cath R chest, HTN, DM, BPH recent admission to [facility] 1/10-1/12 for hyperkalemia during preop for elective L AVF placement with vascular surgery, and s/p HD, now p/w SOB, cough, black stools. He states he has had cough for 7 months, unchanged, wet, but unproductive. No f/c. No hemoptysis. He also reports intermittent L sided CP, sharp. NO abd pain. Stool is watery. +nausea. NO vomiting. Denies hx GIB. States his last HD was Sat because of the way he is feeling. Denies hx COPD

## 2025-01-23 NOTE — ED PROVIDER NOTE - NS ED ROS FT
Constitutional: No fever or chills.   Cardiac: See HPI  Respiratory:Se HPI  GI: No nausea, vomiting, diarrhea or abdominal pain.  : No dysuria, frequency or burning.  Except as documented in the HPI, all other systems are negative.

## 2025-01-23 NOTE — CONSULT NOTE ADULT - ASSESSMENT
78 y/o M  w/ ESRD on HD TTS via right chest TDC, PMHx CAD s/p CABG x4 (2007), PCI to OM1 2/22, HFrEF (12/23 EF 30%), missed dialysis x2 due to body aches, last dialyzed Saturday 1/18/25. admitted for volume overload state due to missed dialysis, nephrology consulted for HD management.    ESRD for HD w/ below parameters for volume and solute clearance  -F180 T 180  UF 2L K 2    -Renal diet  -Daily standing weight  -For repeat dialysis am    HTN/Volume  -Fluid restriction 1 L /d  -Strict I/o chart  -Hold BP meds before each dialysis sessions , can resume after dialysis when indicated    Anemia/CKD: Hb not at goal 7.6  -T sat  26% , please check ferritin level  -Retacrit 8000 unit w/ each dialysis session    CKD/ MBD:  Ca 8.7 PO4 6.2  Cont home binders

## 2025-01-24 VITALS
HEART RATE: 109 BPM | DIASTOLIC BLOOD PRESSURE: 67 MMHG | TEMPERATURE: 99 F | OXYGEN SATURATION: 92 % | RESPIRATION RATE: 19 BRPM | SYSTOLIC BLOOD PRESSURE: 117 MMHG

## 2025-01-24 DIAGNOSIS — D68.9 COAGULATION DEFECT, UNSPECIFIED: ICD-10-CM

## 2025-01-24 DIAGNOSIS — Z29.9 ENCOUNTER FOR PROPHYLACTIC MEASURES, UNSPECIFIED: ICD-10-CM

## 2025-01-24 DIAGNOSIS — I25.10 ATHEROSCLEROTIC HEART DISEASE OF NATIVE CORONARY ARTERY WITHOUT ANGINA PECTORIS: ICD-10-CM

## 2025-01-24 DIAGNOSIS — I73.9 PERIPHERAL VASCULAR DISEASE, UNSPECIFIED: ICD-10-CM

## 2025-01-24 DIAGNOSIS — D64.9 ANEMIA, UNSPECIFIED: ICD-10-CM

## 2025-01-24 DIAGNOSIS — I10 ESSENTIAL (PRIMARY) HYPERTENSION: ICD-10-CM

## 2025-01-24 DIAGNOSIS — R19.7 DIARRHEA, UNSPECIFIED: ICD-10-CM

## 2025-01-24 DIAGNOSIS — R05.9 COUGH, UNSPECIFIED: ICD-10-CM

## 2025-01-24 DIAGNOSIS — N18.6 END STAGE RENAL DISEASE: ICD-10-CM

## 2025-01-24 LAB
ADD ON TEST-SPECIMEN IN LAB: SIGNIFICANT CHANGE UP
ALBUMIN SERPL ELPH-MCNC: 3.6 G/DL — SIGNIFICANT CHANGE UP (ref 3.3–5)
ALP SERPL-CCNC: 104 U/L — SIGNIFICANT CHANGE UP (ref 40–120)
ALT FLD-CCNC: 52 U/L — HIGH (ref 10–45)
ANION GAP SERPL CALC-SCNC: 14 MMOL/L — SIGNIFICANT CHANGE UP (ref 5–17)
AST SERPL-CCNC: 31 U/L — SIGNIFICANT CHANGE UP (ref 10–40)
BASOPHILS # BLD AUTO: 0.07 K/UL — SIGNIFICANT CHANGE UP (ref 0–0.2)
BASOPHILS # BLD AUTO: 0.08 K/UL — SIGNIFICANT CHANGE UP (ref 0–0.2)
BASOPHILS NFR BLD AUTO: 1 % — SIGNIFICANT CHANGE UP (ref 0–2)
BASOPHILS NFR BLD AUTO: 1 % — SIGNIFICANT CHANGE UP (ref 0–2)
BILIRUB SERPL-MCNC: 0.4 MG/DL — SIGNIFICANT CHANGE UP (ref 0.2–1.2)
BLD GP AB SCN SERPL QL: NEGATIVE — SIGNIFICANT CHANGE UP
BUN SERPL-MCNC: 44 MG/DL — HIGH (ref 7–23)
CALCIUM SERPL-MCNC: 8.5 MG/DL — SIGNIFICANT CHANGE UP (ref 8.4–10.5)
CHLORIDE SERPL-SCNC: 97 MMOL/L — SIGNIFICANT CHANGE UP (ref 96–108)
CO2 SERPL-SCNC: 24 MMOL/L — SIGNIFICANT CHANGE UP (ref 22–31)
CREAT SERPL-MCNC: 6.15 MG/DL — HIGH (ref 0.5–1.3)
EGFR: 9 ML/MIN/1.73M2 — LOW
EOSINOPHIL # BLD AUTO: 0.09 K/UL — SIGNIFICANT CHANGE UP (ref 0–0.5)
EOSINOPHIL # BLD AUTO: 0.1 K/UL — SIGNIFICANT CHANGE UP (ref 0–0.5)
EOSINOPHIL NFR BLD AUTO: 1.2 % — SIGNIFICANT CHANGE UP (ref 0–6)
EOSINOPHIL NFR BLD AUTO: 1.3 % — SIGNIFICANT CHANGE UP (ref 0–6)
GLUCOSE SERPL-MCNC: 85 MG/DL — SIGNIFICANT CHANGE UP (ref 70–99)
HCT VFR BLD CALC: 21.4 % — LOW (ref 39–50)
HCT VFR BLD CALC: 28 % — LOW (ref 39–50)
HGB BLD-MCNC: 6.7 G/DL — CRITICAL LOW (ref 13–17)
HGB BLD-MCNC: 8.4 G/DL — LOW (ref 13–17)
IMM GRANULOCYTES NFR BLD AUTO: 0.9 % — SIGNIFICANT CHANGE UP (ref 0–0.9)
IMM GRANULOCYTES NFR BLD AUTO: 0.9 % — SIGNIFICANT CHANGE UP (ref 0–0.9)
LEGIONELLA AG UR QL: NEGATIVE — SIGNIFICANT CHANGE UP
LYMPHOCYTES # BLD AUTO: 0.5 K/UL — LOW (ref 1–3.3)
LYMPHOCYTES # BLD AUTO: 0.62 K/UL — LOW (ref 1–3.3)
LYMPHOCYTES # BLD AUTO: 7.5 % — LOW (ref 13–44)
LYMPHOCYTES # BLD AUTO: 7.7 % — LOW (ref 13–44)
MAGNESIUM SERPL-MCNC: 1.8 MG/DL — SIGNIFICANT CHANGE UP (ref 1.6–2.6)
MCHC RBC-ENTMCNC: 27.1 PG — SIGNIFICANT CHANGE UP (ref 27–34)
MCHC RBC-ENTMCNC: 29.5 PG — SIGNIFICANT CHANGE UP (ref 27–34)
MCHC RBC-ENTMCNC: 30 G/DL — LOW (ref 32–36)
MCHC RBC-ENTMCNC: 31.3 G/DL — LOW (ref 32–36)
MCV RBC AUTO: 90.3 FL — SIGNIFICANT CHANGE UP (ref 80–100)
MCV RBC AUTO: 94.3 FL — SIGNIFICANT CHANGE UP (ref 80–100)
MONOCYTES # BLD AUTO: 0.12 K/UL — SIGNIFICANT CHANGE UP (ref 0–0.9)
MONOCYTES # BLD AUTO: 0.15 K/UL — SIGNIFICANT CHANGE UP (ref 0–0.9)
MONOCYTES NFR BLD AUTO: 1.8 % — LOW (ref 2–14)
MONOCYTES NFR BLD AUTO: 1.9 % — LOW (ref 2–14)
NEUTROPHILS # BLD AUTO: 5.87 K/UL — SIGNIFICANT CHANGE UP (ref 1.8–7.4)
NEUTROPHILS # BLD AUTO: 7 K/UL — SIGNIFICANT CHANGE UP (ref 1.8–7.4)
NEUTROPHILS NFR BLD AUTO: 87.3 % — HIGH (ref 43–77)
NEUTROPHILS NFR BLD AUTO: 87.5 % — HIGH (ref 43–77)
NRBC # BLD: 0 /100 WBCS — SIGNIFICANT CHANGE UP (ref 0–0)
NRBC # BLD: 0 /100 WBCS — SIGNIFICANT CHANGE UP (ref 0–0)
NRBC BLD-RTO: 0 /100 WBCS — SIGNIFICANT CHANGE UP (ref 0–0)
NRBC BLD-RTO: 0 /100 WBCS — SIGNIFICANT CHANGE UP (ref 0–0)
PHOSPHATE SERPL-MCNC: 5.5 MG/DL — HIGH (ref 2.5–4.5)
PLATELET # BLD AUTO: 208 K/UL — SIGNIFICANT CHANGE UP (ref 150–400)
PLATELET # BLD AUTO: 218 K/UL — SIGNIFICANT CHANGE UP (ref 150–400)
POTASSIUM SERPL-MCNC: 3.7 MMOL/L — SIGNIFICANT CHANGE UP (ref 3.5–5.3)
POTASSIUM SERPL-SCNC: 3.7 MMOL/L — SIGNIFICANT CHANGE UP (ref 3.5–5.3)
PROT SERPL-MCNC: 6.8 G/DL — SIGNIFICANT CHANGE UP (ref 6–8.3)
RAPID RVP RESULT: SIGNIFICANT CHANGE UP
RBC # BLD: 2.27 M/UL — LOW (ref 4.2–5.8)
RBC # BLD: 3.1 M/UL — LOW (ref 4.2–5.8)
RBC # FLD: 16.2 % — HIGH (ref 10.3–14.5)
RBC # FLD: 17.5 % — HIGH (ref 10.3–14.5)
RH IG SCN BLD-IMP: POSITIVE — SIGNIFICANT CHANGE UP
S PNEUM AG UR QL: NEGATIVE — SIGNIFICANT CHANGE UP
SARS-COV-2 RNA SPEC QL NAA+PROBE: SIGNIFICANT CHANGE UP
SODIUM SERPL-SCNC: 135 MMOL/L — SIGNIFICANT CHANGE UP (ref 135–145)
WBC # BLD: 6.71 K/UL — SIGNIFICANT CHANGE UP (ref 3.8–10.5)
WBC # BLD: 8.02 K/UL — SIGNIFICANT CHANGE UP (ref 3.8–10.5)
WBC # FLD AUTO: 6.71 K/UL — SIGNIFICANT CHANGE UP (ref 3.8–10.5)
WBC # FLD AUTO: 8.02 K/UL — SIGNIFICANT CHANGE UP (ref 3.8–10.5)

## 2025-01-24 PROCEDURE — 96375 TX/PRO/DX INJ NEW DRUG ADDON: CPT

## 2025-01-24 PROCEDURE — 36415 COLL VENOUS BLD VENIPUNCTURE: CPT

## 2025-01-24 PROCEDURE — 87449 NOS EACH ORGANISM AG IA: CPT

## 2025-01-24 PROCEDURE — 83880 ASSAY OF NATRIURETIC PEPTIDE: CPT

## 2025-01-24 PROCEDURE — 36430 TRANSFUSION BLD/BLD COMPNT: CPT

## 2025-01-24 PROCEDURE — 99223 1ST HOSP IP/OBS HIGH 75: CPT

## 2025-01-24 PROCEDURE — 82803 BLOOD GASES ANY COMBINATION: CPT

## 2025-01-24 PROCEDURE — 80048 BASIC METABOLIC PNL TOTAL CA: CPT

## 2025-01-24 PROCEDURE — 85025 COMPLETE CBC W/AUTO DIFF WBC: CPT

## 2025-01-24 PROCEDURE — 93005 ELECTROCARDIOGRAM TRACING: CPT

## 2025-01-24 PROCEDURE — 84132 ASSAY OF SERUM POTASSIUM: CPT

## 2025-01-24 PROCEDURE — 82247 BILIRUBIN TOTAL: CPT

## 2025-01-24 PROCEDURE — 80053 COMPREHEN METABOLIC PANEL: CPT

## 2025-01-24 PROCEDURE — 87040 BLOOD CULTURE FOR BACTERIA: CPT

## 2025-01-24 PROCEDURE — 84484 ASSAY OF TROPONIN QUANT: CPT

## 2025-01-24 PROCEDURE — 99222 1ST HOSP IP/OBS MODERATE 55: CPT | Mod: GC

## 2025-01-24 PROCEDURE — 84100 ASSAY OF PHOSPHORUS: CPT

## 2025-01-24 PROCEDURE — 86923 COMPATIBILITY TEST ELECTRIC: CPT

## 2025-01-24 PROCEDURE — 83605 ASSAY OF LACTIC ACID: CPT

## 2025-01-24 PROCEDURE — 94640 AIRWAY INHALATION TREATMENT: CPT

## 2025-01-24 PROCEDURE — 0225U NFCT DS DNA&RNA 21 SARSCOV2: CPT

## 2025-01-24 PROCEDURE — 87899 AGENT NOS ASSAY W/OPTIC: CPT

## 2025-01-24 PROCEDURE — 83735 ASSAY OF MAGNESIUM: CPT

## 2025-01-24 PROCEDURE — 87637 SARSCOV2&INF A&B&RSV AMP PRB: CPT

## 2025-01-24 PROCEDURE — 86900 BLOOD TYPING SEROLOGIC ABO: CPT

## 2025-01-24 PROCEDURE — 86850 RBC ANTIBODY SCREEN: CPT

## 2025-01-24 PROCEDURE — P9016: CPT

## 2025-01-24 PROCEDURE — 82272 OCCULT BLD FECES 1-3 TESTS: CPT

## 2025-01-24 PROCEDURE — 90935 HEMODIALYSIS ONE EVALUATION: CPT

## 2025-01-24 PROCEDURE — 71045 X-RAY EXAM CHEST 1 VIEW: CPT

## 2025-01-24 PROCEDURE — 84295 ASSAY OF SERUM SODIUM: CPT

## 2025-01-24 PROCEDURE — 84145 PROCALCITONIN (PCT): CPT

## 2025-01-24 PROCEDURE — 96365 THER/PROPH/DIAG IV INF INIT: CPT

## 2025-01-24 PROCEDURE — 86901 BLOOD TYPING SEROLOGIC RH(D): CPT

## 2025-01-24 PROCEDURE — 82330 ASSAY OF CALCIUM: CPT

## 2025-01-24 PROCEDURE — 82248 BILIRUBIN DIRECT: CPT

## 2025-01-24 PROCEDURE — 99285 EMERGENCY DEPT VISIT HI MDM: CPT | Mod: 25

## 2025-01-24 RX ORDER — ASPIRIN 81 MG/1
81 TABLET, COATED ORAL DAILY
Refills: 0 | Status: DISCONTINUED | OUTPATIENT
Start: 2025-01-25 | End: 2025-01-25

## 2025-01-24 RX ORDER — CARVEDILOL 6.25 MG
6.25 TABLET ORAL EVERY 12 HOURS
Refills: 0 | Status: DISCONTINUED | OUTPATIENT
Start: 2025-01-24 | End: 2025-01-25

## 2025-01-24 RX ORDER — EZETIMIBE 10 MG
10 TABLET ORAL EVERY 24 HOURS
Refills: 0 | Status: DISCONTINUED | OUTPATIENT
Start: 2025-01-24 | End: 2025-01-25

## 2025-01-24 RX ORDER — MECOBAL/LEVOMEFOLAT CA/B6 PHOS 2-3-35 MG
1 TABLET ORAL EVERY 24 HOURS
Refills: 0 | Status: DISCONTINUED | OUTPATIENT
Start: 2025-01-24 | End: 2025-01-25

## 2025-01-24 RX ORDER — ACETAMINOPHEN 160 MG/5ML
650 SUSPENSION ORAL EVERY 6 HOURS
Refills: 0 | Status: DISCONTINUED | OUTPATIENT
Start: 2025-01-24 | End: 2025-01-25

## 2025-01-24 RX ORDER — ACETAMINOPHEN, DIPHENHYDRAMINE HCL, PHENYLEPHRINE HCL 325; 25; 5 MG/1; MG/1; MG/1
5 TABLET ORAL AT BEDTIME
Refills: 0 | Status: DISCONTINUED | OUTPATIENT
Start: 2025-01-24 | End: 2025-01-25

## 2025-01-24 RX ORDER — SEVELAMER CARBONATE 800 MG/1
1 TABLET, FILM COATED ORAL
Refills: 0 | DISCHARGE

## 2025-01-24 RX ORDER — ACETAMINOPHEN, DIPHENHYDRAMINE HCL, PHENYLEPHRINE HCL 325; 25; 5 MG/1; MG/1; MG/1
5 TABLET ORAL AT BEDTIME
Refills: 0 | Status: DISCONTINUED | OUTPATIENT
Start: 2025-01-24 | End: 2025-01-24

## 2025-01-24 RX ORDER — EPOETIN ALFA 2000 [IU]/ML
0 SOLUTION INTRAVENOUS; SUBCUTANEOUS
Refills: 0 | DISCHARGE

## 2025-01-24 RX ORDER — MAGNESIUM SULFATE 0.8 MEQ/ML
2 AMPUL (ML) INJECTION ONCE
Refills: 0 | Status: COMPLETED | OUTPATIENT
Start: 2025-01-24 | End: 2025-01-24

## 2025-01-24 RX ORDER — ATORVASTATIN CALCIUM 80 MG/1
80 TABLET, FILM COATED ORAL AT BEDTIME
Refills: 0 | Status: DISCONTINUED | OUTPATIENT
Start: 2025-01-24 | End: 2025-01-25

## 2025-01-24 RX ORDER — PANTOPRAZOLE 20 MG/1
40 TABLET, DELAYED RELEASE ORAL EVERY 12 HOURS
Refills: 0 | Status: DISCONTINUED | OUTPATIENT
Start: 2025-01-24 | End: 2025-01-25

## 2025-01-24 RX ADMIN — PANTOPRAZOLE 40 MILLIGRAM(S): 20 TABLET, DELAYED RELEASE ORAL at 11:52

## 2025-01-24 RX ADMIN — ACETAMINOPHEN 650 MILLIGRAM(S): 160 SUSPENSION ORAL at 22:30

## 2025-01-24 RX ADMIN — ACETAMINOPHEN 650 MILLIGRAM(S): 160 SUSPENSION ORAL at 03:44

## 2025-01-24 RX ADMIN — Medication 1 TABLET(S): at 17:07

## 2025-01-24 RX ADMIN — ACETAMINOPHEN, DIPHENHYDRAMINE HCL, PHENYLEPHRINE HCL 5 MILLIGRAM(S): 325; 25; 5 TABLET ORAL at 21:14

## 2025-01-24 RX ADMIN — ACETAMINOPHEN, DIPHENHYDRAMINE HCL, PHENYLEPHRINE HCL 5 MILLIGRAM(S): 325; 25; 5 TABLET ORAL at 03:43

## 2025-01-24 RX ADMIN — ATORVASTATIN CALCIUM 80 MILLIGRAM(S): 80 TABLET, FILM COATED ORAL at 21:14

## 2025-01-24 RX ADMIN — PANTOPRAZOLE 40 MILLIGRAM(S): 20 TABLET, DELAYED RELEASE ORAL at 22:26

## 2025-01-24 RX ADMIN — Medication 25 GRAM(S): at 07:54

## 2025-01-24 RX ADMIN — ANTISEPTIC SURGICAL SCRUB 1 APPLICATION(S): 0.04 SOLUTION TOPICAL at 11:52

## 2025-01-24 RX ADMIN — Medication 30 MILLIGRAM(S): at 17:08

## 2025-01-24 RX ADMIN — Medication 6.25 MILLIGRAM(S): at 17:08

## 2025-01-24 RX ADMIN — ACETAMINOPHEN 650 MILLIGRAM(S): 160 SUSPENSION ORAL at 04:25

## 2025-01-24 RX ADMIN — Medication 10 MILLIGRAM(S): at 17:07

## 2025-01-24 RX ADMIN — Medication 6.25 MILLIGRAM(S): at 05:53

## 2025-01-24 RX ADMIN — ACETAMINOPHEN 650 MILLIGRAM(S): 160 SUSPENSION ORAL at 21:13

## 2025-01-24 NOTE — H&P ADULT - ASSESSMENT
78 y/o M  w/ PMHx CAD s/p CABG x4 (2007), PCI to OM1 2/22, HFrEF (12/23 EF 30%), PAD s/p multiple peripheral interventions with L pop aneurysm repair c/b compartment syndrome with fasciotomy and subsequent LLE bypass (3/19), RLE bypass (4/20), undiagnosed clotting disorder? on eliquis, HTN, DM, BPH, ESRD on HD TTS via right chest TDC, missed dialysis x2 due to body aches, last dialyzed Saturday 1/18/25. Admitted for urgent dialysis.    NEURO  AOx3    CARDIOLOGY  #CAD s/p CABG x4 (2007), PCI to OM1 2/22. Home med: aspirin, atorvastatin 80mg, ezetimibe.  - c/w home atorvastatin 80mg  - confirm med rec and start ezetimibe  - hold home aspirin given concern for melena     #PAD s/p multiple peripheral interventions with L pop aneurysm repair c/b compartment syndrome with fasciotomy and subsequent LLE bypass (3/19), RLE bypass (4/20)  - hold home aspirin given concern for melena     #HTN Carvedilol 6.25mg BID and Entresto 24-26 BID.   - Hold BP meds before each dialysis session, can resume after HD    PULM  #Shortness of breath   CXR with inc pulmonary vascular congestion and pro-BNP 63281 iso missed HD sessions suggestive of volume overload. Pt also reports he's been getting short of breath while at his HD sessions and requiring supplemental O2 intermittently at the dialysis center   - Urgent HD as above   - Maintain O2 sat > 89%    #Cough   Likely chronic possibly secondary to undiagnosed COPD given long standing smoking hx. Pt states it is unchanged from discharge.   Don't suspect PNA as pt was recently treated and CXR c/w volume overload. Doesn't meet SIRS/sepsis criteria.  - Tessalon perles q8h PRN for symptomatic management of cough   - Can consider adding on pro-vikram   - Full RVP, urine strep and legionella    GI/  #Diarrhea  No fever, leukocytosis. Abdominal exam w/o TTP, distention, peritoneal signs. Low suspicion for infectious diarrhea. However possible concern for melena given color and +fobt.   - Consider GI PCR if diarrhea persists  - Consider GI consult if melena persists   - Monitor stool count     RENAL  #ESRD on HD TTS - Missed HD sessions   Undergoes HD via right chest TDC. He missed dialysis x2 due to body aches, last dialyzed Saturday 1/18/25.   - Plan for urgent HD today  - f/u nephro recs.  - Plan for AVF of L arm as per nephro/vascular surgery    ENDO  #DM   Home med: Jardiance. A1c 5.4%  - c/w home Jardiance after med rec confirmation in AM    ID   NELI    HEME/ONC  #Anemia  Iron studies 1/12: iron 44, TIBC 170, ferritin 1432.   c/w anemia of chronic disease iso ESRD.   - ctm H&H     PROPHYLAXIS  F: None   E: Replete as necessary K>4 Mg>2  N: DASH/TLC  DVT Prophylaxis: HOLD home Eliquis   GI prophylaxis: None   CODE STATUS: FULL CODE  Dispo: [facility]    78 y/o M  w/ PMHx CAD s/p CABG x4 (2007), PCI to OM1 2/22, HFrEF (12/23 EF 30%), PAD s/p multiple peripheral interventions with L pop aneurysm repair c/b compartment syndrome with fasciotomy and subsequent LLE bypass (3/19), RLE bypass (4/20), undiagnosed clotting disorder? on eliquis, HTN, DM, BPH, ESRD on HD TTS via right chest TDC, missed dialysis x2 due to body aches, last dialyzed Saturday 1/18/25. Admitted for urgent dialysis.    NEURO  AOx3    CARDIOLOGY  #CAD s/p CABG x4 (2007), PCI to OM1 2/22. Home med: aspirin, atorvastatin 80mg, ezetimibe.  - c/w home atorvastatin 80mg  - confirm med rec and start ezetimibe  - hold home aspirin given concern for melena     #PAD s/p multiple peripheral interventions with L pop aneurysm repair c/b compartment syndrome with fasciotomy and subsequent LLE bypass (3/19), RLE bypass (4/20)  - hold home aspirin given concern for melena     #Clotting disorder  - On Eliquis at home  - hold given concern of melena    #HTN Carvedilol 6.25mg BID and Entresto 24-26 BID.   - Hold BP meds before each dialysis session, can resume after HD    PULM  #Shortness of breath   CXR with inc pulmonary vascular congestion and pro-BNP 95440 iso missed HD sessions suggestive of volume overload. Pt also reports he's been getting short of breath while at his HD sessions and requiring supplemental O2 intermittently at the dialysis center   - Urgent HD as above   - Maintain O2 sat > 89%    #Cough   Likely chronic possibly secondary to undiagnosed COPD given long standing smoking hx. Pt states it is unchanged from discharge.   Don't suspect PNA as pt was recently treated and CXR c/w volume overload. Doesn't meet SIRS/sepsis criteria.  - Tessalon perles q8h PRN for symptomatic management of cough   - Can consider adding on pro-vikram   - Full RVP, urine strep and legionella    GI/  #Diarrhea  No fever, leukocytosis. Abdominal exam w/o TTP, distention, peritoneal signs. Low suspicion for infectious diarrhea. However possible concern for melena given color and +fobt.   - Consider GI PCR if diarrhea persists  - Consider GI consult if melena persists   - Monitor stool count     RENAL  #ESRD on HD TTS - Missed HD sessions   Undergoes HD via right chest TDC. He missed dialysis x2 due to body aches, last dialyzed Saturday 1/18/25.   - Plan for urgent HD today  - f/u nephro recs.  - Plan for AVF of L arm as per nephro/vascular surgery    ENDO  #DM   Home med: Jardiance. A1c 5.4%  - c/w home Jardiance after med rec confirmation in AM    ID   NELI    HEME/ONC  #Anemia  Iron studies 1/12: iron 44, TIBC 170, ferritin 1432.   c/w anemia of chronic disease iso ESRD.   - ctm H&H     PROPHYLAXIS  F: None   E: Replete as necessary K>4 Mg>2  N: DASH/TLC  DVT Prophylaxis: HOLD home Eliquis   GI prophylaxis: None   CODE STATUS: FULL CODE  Dispo: [facility]

## 2025-01-24 NOTE — PROGRESS NOTE ADULT - PROBLEM SELECTOR PLAN 6
s/p multiple peripheral interventions with L pop aneurysm repair c/b compartment syndrome with fasciotomy and subsequent LLE bypass (3/19), RLE bypass (4/20)  - hold home aspirin given concern for melena s/p multiple peripheral interventions with L pop aneurysm repair c/b compartment syndrome with fasciotomy and subsequent LLE bypass (3/19), RLE bypass (4/20)  - restart home aspirin

## 2025-01-24 NOTE — PROGRESS NOTE ADULT - SUBJECTIVE AND OBJECTIVE BOX
SUBJECTIVE:  GIANNA SALAZAR is doing well this morning. Today is hospital day 1d.     24 Hour Events:   - No acute overnight events.      MEDICATIONS:  STANDING MEDICATIONS  atorvastatin 80 milliGRAM(s) Oral at bedtime  carvedilol 6.25 milliGRAM(s) Oral every 12 hours  chlorhexidine 2% Cloths 1 Application(s) Topical daily  melatonin 5 milliGRAM(s) Oral at bedtime    PRN MEDICATIONS  acetaminophen     Tablet .. 650 milliGRAM(s) Oral every 6 hours PRN  benzonatate 100 milliGRAM(s) Oral every 8 hours PRN    VITALS:   ICU Vital Signs Last 24 Hrs  T(C): 37 (24 Jan 2025 06:10), Max: 37 (23 Jan 2025 17:30)  T(F): 98.6 (24 Jan 2025 06:10), Max: 98.6 (23 Jan 2025 17:30)  HR: 98 (24 Jan 2025 05:50) (18 - 102)  BP: 114/56 (24 Jan 2025 05:50) (113/65 - 179/95)  BP(mean): 80 (24 Jan 2025 05:50) (80 - 111)  ABP: --  ABP(mean): --  RR: 18 (24 Jan 2025 05:50) (18 - 20)  SpO2: 95% (24 Jan 2025 05:50) (94% - 100%)    O2 Parameters below as of 24 Jan 2025 05:50  Patient On (Oxygen Delivery Method): nasal cannula  O2 Flow (L/min): 2        PHYSICAL EXAM  General: well appearing, in no acute distress    HEENT: NC/AT, sclera anicteric, conjunctiva clear, mucus membranes moist, no lymphadenopathy, no JVD appreciated   Lungs: anterior and posterior lung fields clear to auscultation bilaterally, no wheezes, rhonchi or rales   Heart: regular rate and rhythm, normal S1 S2, no murmurs/rubs/gallops    Abdomen: soft, non-tender, non-distended, bowel sounds present   Extremities: atraumatic, no lower extremity edema, clubbing or cyanosis, distal pulses 2+ at DP and radial bilaterally    Skin: normal skin texture and turgor without rashes or lesions, no diaphoresis   Neuro: A&Ox3, clear speech, facial features symmetrical, moving all extremities spontaneously, strength grossly intact, sensation intact to light touch at distal upper and lower extremities bilaterally     01-23-25 @ 07:01  -  01-24-25 @ 07:00  --------------------------------------------------------  IN: 0 mL / OUT: 2000 mL / NET: -2000 mL    I&O's Detail    23 Jan 2025 07:01  -  24 Jan 2025 07:00  --------------------------------------------------------  IN:  Total IN: 0 mL    OUT:    Other (mL): 2000 mL  Total OUT: 2000 mL    Total NET: -2000 mL          Orthostatic VS      VENT DATA:      LABS:                        7.6    10.17 )-----------( 251      ( 23 Jan 2025 17:15 )             25.6     01-23    134[L]  |  99  |  97[H]  ----------------------------<  111[H]  5.8[H]   |  16[L]  |  10.23[H]    Ca    8.7      23 Jan 2025 17:15        Urinalysis Basic - ( 23 Jan 2025 17:15 )    Color: x / Appearance: x / SG: x / pH: x  Gluc: 111 mg/dL / Ketone: x  / Bili: x / Urobili: x   Blood: x / Protein: x / Nitrite: x   Leuk Esterase: x / RBC: x / WBC x   Sq Epi: x / Non Sq Epi: x / Bacteria: x        Lactate, Blood: 0.9 mmol/L (01-23-25 @ 17:15)        MICRO:    CARDS:        RADIOLOGY:        Lines:  Central line:              Date placed:             Indication:   Intravenous Access:   NG tube:   Galeas Catheter:       GI PCR Panel Stool: Lab Draw - Next Collection (01-23-25 @ 21:29)  Urinalysis with Rflx Culture: STAT  Specimen Source: Clean Catch (Midstream) (01-23-25 @ 17:10)     SUBJECTIVE:  GIANNA SALAZAR is doing well this morning. Today is hospital day 1d.     24 Hour Events:   - No acute overnight events.  - GOC conversation had - patient DNR with trial of intubation      MEDICATIONS:  STANDING MEDICATIONS  atorvastatin 80 milliGRAM(s) Oral at bedtime  carvedilol 6.25 milliGRAM(s) Oral every 12 hours  chlorhexidine 2% Cloths 1 Application(s) Topical daily  melatonin 5 milliGRAM(s) Oral at bedtime    PRN MEDICATIONS  acetaminophen     Tablet .. 650 milliGRAM(s) Oral every 6 hours PRN  benzonatate 100 milliGRAM(s) Oral every 8 hours PRN    VITALS:   ICU Vital Signs Last 24 Hrs  T(C): 37 (24 Jan 2025 06:10), Max: 37 (23 Jan 2025 17:30)  T(F): 98.6 (24 Jan 2025 06:10), Max: 98.6 (23 Jan 2025 17:30)  HR: 98 (24 Jan 2025 05:50) (18 - 102)  BP: 114/56 (24 Jan 2025 05:50) (113/65 - 179/95)  BP(mean): 80 (24 Jan 2025 05:50) (80 - 111)  ABP: --  ABP(mean): --  RR: 18 (24 Jan 2025 05:50) (18 - 20)  SpO2: 95% (24 Jan 2025 05:50) (94% - 100%)    O2 Parameters below as of 24 Jan 2025 05:50  Patient On (Oxygen Delivery Method): nasal cannula  O2 Flow (L/min): 2        PHYSICAL EXAM  General: well appearing, in no acute distress    HEENT: NC/AT, sclera anicteric, conjunctiva clear, mucus membranes moist, no lymphadenopathy, no JVD appreciated   Lungs: anterior and posterior lung fields clear to auscultation bilaterally, no wheezes, rhonchi or rales   Heart: regular rate and rhythm, normal S1 S2, no murmurs/rubs/gallops    Abdomen: soft, non-tender, non-distended, bowel sounds present   Extremities: atraumatic, no lower extremity edema, clubbing or cyanosis, distal pulses 2+ at DP and radial bilaterally    Skin: normal skin texture and turgor without rashes or lesions, no diaphoresis   Neuro: A&Ox3, clear speech, facial features symmetrical, moving all extremities spontaneously, strength grossly intact, sensation intact to light touch at distal upper and lower extremities bilaterally     01-23-25 @ 07:01  -  01-24-25 @ 07:00  --------------------------------------------------------  IN: 0 mL / OUT: 2000 mL / NET: -2000 mL    I&O's Detail    23 Jan 2025 07:01  -  24 Jan 2025 07:00  --------------------------------------------------------  IN:  Total IN: 0 mL    OUT:    Other (mL): 2000 mL  Total OUT: 2000 mL    Total NET: -2000 mL          Orthostatic VS      VENT DATA:      LABS:                        7.6    10.17 )-----------( 251      ( 23 Jan 2025 17:15 )             25.6     01-23    134[L]  |  99  |  97[H]  ----------------------------<  111[H]  5.8[H]   |  16[L]  |  10.23[H]    Ca    8.7      23 Jan 2025 17:15        Urinalysis Basic - ( 23 Jan 2025 17:15 )    Color: x / Appearance: x / SG: x / pH: x  Gluc: 111 mg/dL / Ketone: x  / Bili: x / Urobili: x   Blood: x / Protein: x / Nitrite: x   Leuk Esterase: x / RBC: x / WBC x   Sq Epi: x / Non Sq Epi: x / Bacteria: x        Lactate, Blood: 0.9 mmol/L (01-23-25 @ 17:15)        MICRO:    CARDS:        RADIOLOGY:        Lines:  Central line:              Date placed:             Indication:   Intravenous Access:   NG tube:   Galeas Catheter:       GI PCR Panel Stool: Lab Draw - Next Collection (01-23-25 @ 21:29)  Urinalysis with Rflx Culture: STAT  Specimen Source: Clean Catch (Midstream) (01-23-25 @ 17:10)     SUBJECTIVE:  GIANNA SALAZAR is doing well this morning. Today is hospital day 1d.     24 Hour Events:   - No acute overnight events.  - GOC conversation had - patient DNR with trial of noninvasive, patient has understood that his ESRD is end-stage and will need lifelong dialysis  - patient described his difficulties with attending dialysis sessions, including transportation (takes the bus, wakes up at 4 am for this)  - describes multiple episodes of watery diarrhea with black bits, no overt red blood  - does decribe progressive 1 week weakness and "chest congestion" with a cough  - med rec verified with Dr. Terrell Whitfield (cardiology) office - patient last echo 9/24 was HFpEF EF 40% and wnl     Meds recently DC'd  · 	Eliquis 5 mg oral tablet NO  · 	aspirin 81 mg NO dc'd on 9/6/24  	Jardiance 10 mg oral tablet - discontinued  · 	Entresto 24 mg-26 mg oral tablet      MEDICATIONS:  STANDING MEDICATIONS  atorvastatin 80 milliGRAM(s) Oral at bedtime  carvedilol 6.25 milliGRAM(s) Oral every 12 hours  chlorhexidine 2% Cloths 1 Application(s) Topical daily  melatonin 5 milliGRAM(s) Oral at bedtime    PRN MEDICATIONS  acetaminophen     Tablet .. 650 milliGRAM(s) Oral every 6 hours PRN  benzonatate 100 milliGRAM(s) Oral every 8 hours PRN    VITALS:   ICU Vital Signs Last 24 Hrs  T(C): 37 (24 Jan 2025 06:10), Max: 37 (23 Jan 2025 17:30)  T(F): 98.6 (24 Jan 2025 06:10), Max: 98.6 (23 Jan 2025 17:30)  HR: 98 (24 Jan 2025 05:50) (18 - 102)  BP: 114/56 (24 Jan 2025 05:50) (113/65 - 179/95)  BP(mean): 80 (24 Jan 2025 05:50) (80 - 111)  ABP: --  ABP(mean): --  RR: 18 (24 Jan 2025 05:50) (18 - 20)  SpO2: 95% (24 Jan 2025 05:50) (94% - 100%)    O2 Parameters below as of 24 Jan 2025 05:50  Patient On (Oxygen Delivery Method): nasal cannula  O2 Flow (L/min): 2        PHYSICAL EXAM  General: Not in acute distress, well-nourished  Neck: No visible mass, No JVD noted  Chest: CTAP b/l, no use of accessory respiratory muscles, chest ++ crackles  Heart: RRR, S1/S2 wnl, no MRG  Abdomen: Soft, nontender, nondistended,  no hepatosplenomegaly  Extremities: No clubbing, cyanosis, +1 edema to legs  Neuro:  Alert, no apparent focal deficits, answer questions appropriately  Access: R chest [facility] c/d/i    01-23-25 @ 07:01  -  01-24-25 @ 07:00  --------------------------------------------------------  IN: 0 mL / OUT: 2000 mL / NET: -2000 mL    I&O's Detail    23 Jan 2025 07:01  -  24 Jan 2025 07:00  --------------------------------------------------------  IN:  Total IN: 0 mL    OUT:    Other (mL): 2000 mL  Total OUT: 2000 mL    Total NET: -2000 mL          Orthostatic VS      VENT DATA:      LABS:                        7.6    10.17 )-----------( 251      ( 23 Jan 2025 17:15 )             25.6     01-23    134[L]  |  99  |  97[H]  ----------------------------<  111[H]  5.8[H]   |  16[L]  |  10.23[H]    Ca    8.7      23 Jan 2025 17:15        Urinalysis Basic - ( 23 Jan 2025 17:15 )    Color: x / Appearance: x / SG: x / pH: x  Gluc: 111 mg/dL / Ketone: x  / Bili: x / Urobili: x   Blood: x / Protein: x / Nitrite: x   Leuk Esterase: x / RBC: x / WBC x   Sq Epi: x / Non Sq Epi: x / Bacteria: x        Lactate, Blood: 0.9 mmol/L (01-23-25 @ 17:15)        MICRO:    CARDS:        RADIOLOGY:        Lines:  Central line:              Date placed:             Indication:   Intravenous Access:   NG tube:   Galeas Catheter:       GI PCR Panel Stool: Lab Draw - Next Collection (01-23-25 @ 21:29)  Urinalysis with Rflx Culture: STAT  Specimen Source: Clean Catch (Midstream) (01-23-25 @ 17:10)     ** Step down from 7LA to RMF **    78 y/o M  w/ PMHx CAD s/p CABG x4 (2007), PCI to OM1 2/22, HFpEF (9/24 40%, follows outpatient cardiologist Dr. Pierre Whitfield at [facility], was previously on GDMT but entresto and jardiance stopped in sept 2024) PAD s/p multiple peripheral interventions with L pop aneurysm repair c/b compartment syndrome with fasciotomy and subsequent LLE bypass (3/19), RLE bypass (4/20), undiagnosed clotting disorder leading to PE, was previously on Eliquis and aspirin but dc since sept 24' by cardiologist, HTN, DM, BPH, ESRD (on HD TTS via right chest TDC), missed dialysis x2 (Sat and Tues 1/23) due to body aches, malaise, and "feeling sick" in addition to having diarrhea x4 watery bowel movements, and dark stools. He was last dialyzed outpatient Saturday 1/18/25. Admitted for urgent dialysis, received this on 1/23 with 2L removed via UF. Rapid RVP negative. His CBC on 1/24 AM was 6.7, was ordered 1 packed unit of RBC with post transfusion CBC of 8.4 at 1400 on 1/24. Patient endorsing diarrhea in outpatient setting but no stool sample collected for GI PCR. Due to the positive FOBT and drop in CBC despite having HD that day, GI bleed cannot be ruled out, and GI was consulted. Iron studies and hemolysis labs ordered for 1/25 AM. Patient is DNR/DNI (trial NIV) as per MOST 1/24/2025.      SUBJECTIVE:  GIANNA SALAZAR is doing well this morning. Today is hospital day 1d.     24 Hour Events:   - No acute overnight events.  - GOC conversation had - patient DNR with trial of noninvasive, patient has understood that his ESRD is end-stage and will need lifelong dialysis  - patient described his difficulties with attending dialysis sessions, including transportation (takes the bus, wakes up at 4 am for this)  - describes multiple episodes of watery diarrhea with black bits, no overt red blood  - does decribe progressive 1 week weakness and "chest congestion" with a cough  - med rec verified with Dr. Terrell Whitfield (cardiology) office - patient last echo 9/24 was HFpEF EF 40% and wnl     Meds recently DC'd  · 	Eliquis 5 mg oral tablet NO  · 	aspirin 81 mg NO dc'd on 9/6/24  	Jardiance 10 mg oral tablet - discontinued  · 	Entresto 24 mg-26 mg oral tablet      MEDICATIONS:  STANDING MEDICATIONS  atorvastatin 80 milliGRAM(s) Oral at bedtime  carvedilol 6.25 milliGRAM(s) Oral every 12 hours  chlorhexidine 2% Cloths 1 Application(s) Topical daily  melatonin 5 milliGRAM(s) Oral at bedtime    PRN MEDICATIONS  acetaminophen     Tablet .. 650 milliGRAM(s) Oral every 6 hours PRN  benzonatate 100 milliGRAM(s) Oral every 8 hours PRN    VITALS:   ICU Vital Signs Last 24 Hrs  T(C): 37 (24 Jan 2025 06:10), Max: 37 (23 Jan 2025 17:30)  T(F): 98.6 (24 Jan 2025 06:10), Max: 98.6 (23 Jan 2025 17:30)  HR: 98 (24 Jan 2025 05:50) (18 - 102)  BP: 114/56 (24 Jan 2025 05:50) (113/65 - 179/95)  BP(mean): 80 (24 Jan 2025 05:50) (80 - 111)  ABP: --  ABP(mean): --  RR: 18 (24 Jan 2025 05:50) (18 - 20)  SpO2: 95% (24 Jan 2025 05:50) (94% - 100%)    O2 Parameters below as of 24 Jan 2025 05:50  Patient On (Oxygen Delivery Method): nasal cannula  O2 Flow (L/min): 2        PHYSICAL EXAM  General: Not in acute distress, well-nourished  Neck: No visible mass, No JVD noted  Chest: CTAP b/l, no use of accessory respiratory muscles, chest ++ crackles  Heart: RRR, S1/S2 wnl, no MRG  Abdomen: Soft, nontender, nondistended,  no hepatosplenomegaly  Extremities: No clubbing, cyanosis, +1 edema to legs  Neuro:  Alert, no apparent focal deficits, answer questions appropriately  Access: R chest [facility] c/d/i    01-23-25 @ 07:01  -  01-24-25 @ 07:00  --------------------------------------------------------  IN: 0 mL / OUT: 2000 mL / NET: -2000 mL    I&O's Detail    23 Jan 2025 07:01  -  24 Jan 2025 07:00  --------------------------------------------------------  IN:  Total IN: 0 mL    OUT:    Other (mL): 2000 mL  Total OUT: 2000 mL    Total NET: -2000 mL          Orthostatic VS      VENT DATA:      LABS:                        7.6    10.17 )-----------( 251      ( 23 Jan 2025 17:15 )             25.6     01-23    134[L]  |  99  |  97[H]  ----------------------------<  111[H]  5.8[H]   |  16[L]  |  10.23[H]    Ca    8.7      23 Jan 2025 17:15        Urinalysis Basic - ( 23 Jan 2025 17:15 )    Color: x / Appearance: x / SG: x / pH: x  Gluc: 111 mg/dL / Ketone: x  / Bili: x / Urobili: x   Blood: x / Protein: x / Nitrite: x   Leuk Esterase: x / RBC: x / WBC x   Sq Epi: x / Non Sq Epi: x / Bacteria: x        Lactate, Blood: 0.9 mmol/L (01-23-25 @ 17:15)        MICRO:    CARDS:        RADIOLOGY:        Lines:  Central line:              Date placed:             Indication:   Intravenous Access:   NG tube:   Galeas Catheter:       GI PCR Panel Stool: Lab Draw - Next Collection (01-23-25 @ 21:29)  Urinalysis with Rflx Culture: STAT  Specimen Source: Clean Catch (Midstream) (01-23-25 @ 17:10)

## 2025-01-24 NOTE — PROGRESS NOTE ADULT - PROBLEM SELECTOR PLAN 4
Iron studies 1/12: iron 44, TIBC 170, ferritin 1432.   c/w anemia of chronic disease iso ESRD.   - ctm H&H

## 2025-01-24 NOTE — PROGRESS NOTE ADULT - ASSESSMENT
80 y/o M  w/ PMHx CAD s/p CABG x4 (2007), PCI to OM1 2/22, HFrEF (12/23 EF 30%), PAD s/p multiple peripheral interventions with L pop aneurysm repair c/b compartment syndrome with fasciotomy and subsequent LLE bypass (3/19), RLE bypass (4/20), undiagnosed clotting disorder? on eliquis, HTN, DM, BPH, ESRD on HD TTS via right chest TDC, missed dialysis x2 due to body aches, last dialyzed Saturday 1/18/25. Admitted for urgent dialysis.    NEURO  AOx3    CARDIOLOGY  #CAD s/p CABG x4 (2007), PCI to OM1 2/22. Home med: aspirin, atorvastatin 80mg, ezetimibe.  - c/w home atorvastatin 80mg  - confirm med rec and start ezetimibe  - hold home aspirin given concern for melena     #PAD s/p multiple peripheral interventions with L pop aneurysm repair c/b compartment syndrome with fasciotomy and subsequent LLE bypass (3/19), RLE bypass (4/20)  - hold home aspirin given concern for melena     #Clotting disorder  - On Eliquis at home  - hold given concern of melena    #HTN Carvedilol 6.25mg BID and Entresto 24-26 BID.   - Hold BP meds before each dialysis session, can resume after HD    PULM  #Shortness of breath   CXR with inc pulmonary vascular congestion and pro-BNP 88536 iso missed HD sessions suggestive of volume overload. Pt also reports he's been getting short of breath while at his HD sessions and requiring supplemental O2 intermittently at the dialysis center   - Urgent HD as above   - Maintain O2 sat > 89%    #Cough   Likely chronic possibly secondary to undiagnosed COPD given long standing smoking hx. Pt states it is unchanged from discharge.   Don't suspect PNA as pt was recently treated and CXR c/w volume overload. Doesn't meet SIRS/sepsis criteria.  - Tessalon perles q8h PRN for symptomatic management of cough   - Can consider adding on pro-vikram   - Full RVP, urine strep and legionella    GI/  #Diarrhea  No fever, leukocytosis. Abdominal exam w/o TTP, distention, peritoneal signs. Low suspicion for infectious diarrhea. However possible concern for melena given color and +fobt.   - Consider GI PCR if diarrhea persists  - Consider GI consult if melena persists   - Monitor stool count     RENAL  #ESRD on HD TTS - Missed HD sessions   Undergoes HD via right chest TDC. He missed dialysis x2 due to body aches, last dialyzed Saturday 1/18/25.   - Plan for urgent HD today  - f/u nephro recs.  - Plan for AVF of L arm as per nephro/vascular surgery    ENDO  #DM   Home med: Jardiance. A1c 5.4%  - c/w home Jardiance after med rec confirmation in AM    ID   NELI    HEME/ONC  #Anemia  Iron studies 1/12: iron 44, TIBC 170, ferritin 1432.   c/w anemia of chronic disease iso ESRD.   - ctm H&H     PROPHYLAXIS  F: None   E: Replete as necessary K>4 Mg>2  N: DASH/TLC  DVT Prophylaxis: HOLD home Eliquis   GI prophylaxis: None   CODE STATUS: FULL CODE  Dispo: [facility]    78 y/o M  w/ PMHx CAD s/p CABG x4 (2007), PCI to OM1 2/22, HFrEF (12/23 EF 30%), PAD s/p multiple peripheral interventions with L pop aneurysm repair c/b compartment syndrome with fasciotomy and subsequent LLE bypass (3/19), RLE bypass (4/20), undiagnosed clotting disorder? on eliquis, HTN, DM, BPH, ESRD on HD TTS via right chest TDC, missed dialysis x2 due to body aches, last dialyzed Saturday 1/18/25. Admitted for urgent dialysis.    NEURO  AOx3    CARDIOLOGY  #CAD s/p CABG x4 (2007), PCI to OM1 2/22. Home med: aspirin, atorvastatin 80mg, ezetimibe.  - c/w home atorvastatin 80mg  - patient med red updated - patient not on any anticoagulation     #PAD s/p multiple peripheral interventions with L pop aneurysm repair c/b compartment syndrome with fasciotomy and subsequent LLE bypass (3/19), RLE bypass (4/20)  - hold home aspirin given concern for melena     #Clotting disorder  - eloquis for the patient was dc'd in september per collateral from cardiologists office, it is unclear what clotting dx he has had, but has had PE's before per cardiologist, unclear if provoked or non-provoked since this was not in chart note read by cardiologist office nurse on collateral found today 1/24    Plan  - hold all AC given concern of melena    #HTN Carvedilol 6.25mg BID   - patient does not take entresto anymore since 09/24  - Hold BP meds before each dialysis session, can resume after HD    PULM  #Shortness of breath   CXR with inc pulmonary vascular congestion and pro-BNP 51871 iso missed HD sessions suggestive of volume overload. Pt also reports he's been getting short of breath while at his HD sessions and requiring supplemental O2 intermittently at the dialysis center   - Urgent HD as above   - Maintain O2 sat > 89%    #Cough   Likely chronic possibly secondary to undiagnosed COPD given long standing smoking hx. Pt states it is unchanged from discharge.   Don't suspect PNA as pt was recently treated and CXR c/w volume overload. Doesn't meet SIRS/sepsis criteria.  - Tessalon perles q8h PRN for symptomatic management of cough   - Can consider adding on pro-vikram   - Full RVP, urine strep and legionella    GI/  #Diarrhea  No fever, leukocytosis. Abdominal exam w/o TTP, distention, peritoneal signs. cannot rule out infectious diarrhea (acute) given frequency of watery stools  Plan  - Consider GI PCR if diarrhea persists  - Consider GI consult if melena persists   - Monitor stool count     RENAL  #ESRD on HD TTS - Missed HD sessions   Undergoes HD via right chest TDC. He missed dialysis x2 due to body aches, last dialyzed Saturday 1/18/25.   Plan  - UF 2L yesterday 1/23, recieved bedside dialysis 1/24  - daily standing weight, fluid restr 1L and strict I&O  - SW consult since additional social barriers preventing dialysis  - f/u nephro recs.  - Plan for AVF of L arm as per nephro/vascular surgery    ENDO  #DM   Home med:  A1c 5.4%  - patient not on any Jardiance or other diabetes therapy per collateral today 1/24    ID   NELI    HEME/ONC  #Anemia  Iron studies 1/12: iron 44, TIBC 170, ferritin 1432.   c/w anemia of chronic disease iso ESRD.   - ctm H&H     PROPHYLAXIS  F: None   E: Replete as necessary K>4 Mg>2  N: DASH/TLC  DVT Prophylaxis: patient does not take anticoagulation at home as verified by 1.24 but has had PE per previous history  GI prophylaxis: None   CODE STATUS: DNR/trial non-invaisive  Dispo: [facility]

## 2025-01-24 NOTE — H&P ADULT - NSHPPHYSICALEXAM_GEN_ALL_CORE
General: NAD, sitting in bed  HEENT: PERRL, EOM, anicteric sclera  Cardiovascular: +S1/S2; RRR; no M/R/G  Respiratory: wheezing noted on exam; no iWOB  Gastrointestinal: soft, ND; tender to palpation in RLQ  Extremities: WWP; 2+ peripheral pulses bilaterally; b/l pitting LE edema  Skin: R chest TDC cath in place; no swelling or erythema around the site  Neurologic: AOx3, no focal deficits

## 2025-01-24 NOTE — PROGRESS NOTE ADULT - PROBLEM SELECTOR PLAN 9
Problem: Safety  Goal: Will remain free from injury  Outcome: PROGRESSING AS EXPECTED  Pt is instructed to call when in need of assistance.       Goal: Will remain free from falls  Outcome: PROGRESSING AS EXPECTED  Bed in lowest position.   Treaded socks, hourly rounding, and bed alarm in place.         
[Initial Evaluation] : an initial evaluation
F: None   E: Replete as necessary K>4 Mg>2  N: DASH/TLC  DVT Prophylaxis: patient does not take anticoagulation at home as verified by 1.24 but has had PE per previous history  GI prophylaxis: None   CODE STATUS: DNR/trial non-invaisive  Dispo: [facility]

## 2025-01-24 NOTE — H&P ADULT - HISTORY OF PRESENT ILLNESS
80 y/o M  w/ PMHx CAD s/p CABG x4 (2007), PCI to OM1 2/22, HFrEF (12/23 EF 30%), PAD s/p multiple peripheral interventions with L pop aneurysm repair c/b compartment syndrome with fasciotomy and subsequent LLE bypass (3/19), RLE bypass (4/20), undiagnosed clotting disorder? on eliquis, HTN, DM, BPH, ESRD on HD TTS via right chest TDC, missed dialysis x2 due to body aches, last dialyzed Saturday 1/18/25. He also had diarrhea yesterday (4 loose BM, black in color), cough, shortness of breath and chest pain. Denies fever or chills. He had a recent admission to [facility] 1/10/25 for missed HD sessions requiring inpatient HD. Was also treated for RLL PNA with CTX and Azithro. He endorses having a dry cough since having PNA initially 6 months ago.   Was planned for AVF creation however that was postponed and patient was recommended to reschedule with vascular surgery outpatient for AVF of L arm.   ED Course:  VS on admission: T 98.2, , /95, RR 20, SPO2 99% on RA   Labs s/f Hb 7.6, Na 134, K 5.8, bicarb 16, AG 19, BUN 97. Cr 10.23 (elevated from 7), Trop 134, pro-BNP 84262.   VBG with pH 7.19, pCO2 44, pO2 < 33. HCO3 17.   mini RVP negative.   CXR with increased pulmonary vascular congestion

## 2025-01-24 NOTE — H&P ADULT - ATTENDING COMMENTS
Pt seen on rounds with team. Hemodynamically stable and tolerating HD. Hb 8.4 post one unit prbc. continue PPI and GI to see for EGD. continue plans per nephrology.

## 2025-01-24 NOTE — PROGRESS NOTE ADULT - PROBLEM SELECTOR PLAN 1
Undergoes HD via right chest TDC. He missed dialysis x2 due to body aches, last dialyzed Saturday 1/18/25.   Plan  - UF 2L yesterday 1/23, recieved bedside dialysis 1/24  - daily standing weight, fluid restr 1L and strict I&O  - SW consult since additional social barriers preventing dialysis  - f/u nephro recs.  - Plan for AVF of L arm as per nephro/vascular surgery Undergoes HD via right chest TDC. He missed dialysis x2 due to body aches, last dialyzed Saturday 1/18/25.   UF 2L 1/23, received bedside dialysis 1/24  Plan  - daily standing weight, fluid restr 1L and strict I&O  - SW consult since additional social barriers preventing dialysis  - f/u nephro recs.  - Plan for AVF of L arm as per nephro/vascular surgery

## 2025-01-24 NOTE — PROGRESS NOTE ADULT - ATTENDING COMMENTS
I:   Missed dialysis. Seen on dialysis for fludi overload.   A: ESRD. Fluid overload.   P: Continue dialysis. UF as tolerated.
78 yo M with PMHx ESRD (HD TThSat via R-TDC), HTN, HLD, CAD (s/p CABG, PCI), HFrEF, PAD, hx L popliteal aneurysm (s/p repeat c/b compartment syndrome s/p fasciotomy, LLE bypass), BPH, recent admission for hyperkalemia (1/10-1/12) initially px from home 1/23 with shortness of breath s/p missed HD, admitted to [facility]/telemetry for urgent HD, now stable for stepdown to [facility] for ongoing management.      At time of stepdown, VSS. EKG, labs, imaging, and infectious work-up reviewed. Hb 8.4 s/p 1U pRBC for Hb 6.7. +FOBT on admission however no further witnessed episodes of melena/bleeding and otherwise HD stable. GI consulted, no immediate plans for endoscopic evaluation at this time. Importantly, med rec/collateral obtained by 7[facility]/telemetry team prior to stepdown. Pt no longer on Eliquis, Entresto, Jardiance/DM tx since 9/2024.      [ ] Continue to hold Eliquis (Hx PE? Unspecified clotting disorder)   - Collateral obtained by 7[facility]/tele from outpatient cardiologist   - Pt has been OFF AC/Eliquis since 9/2024   - Outpatient follow-up on discharge to discuss indications/resuming AC   [ ] Resume home ASA (PAD/PVD)   - HD stable without further bleeding   [ ] PPI IV Q12      [ ] Renal following (ESRD)   - s/p HD x2 (1/23, 1/24)   - Hold home anti-HTN prior to HD

## 2025-01-24 NOTE — PROGRESS NOTE ADULT - PROBLEM SELECTOR PLAN 3
Likely chronic possibly secondary to undiagnosed COPD given long standing smoking hx. Pt states it is unchanged from discharge.   Don't suspect PNA as pt was recently treated and CXR c/w volume overload. Doesn't meet SIRS/sepsis criteria.  RVP, urine strep, urine legneg  procal 0.34  Plan:  - Tessalon perles q8h PRN for symptomatic management of cough

## 2025-01-24 NOTE — PROGRESS NOTE ADULT - PROBLEM SELECTOR PLAN 2
No fever, leukocytosis. Abdominal exam w/o TTP, distention, peritoneal signs. cannot rule out infectious diarrhea (acute) given frequency of watery stools  Plan  - Consider GI PCR if diarrhea persists  - Consider GI consult if melena persists   - Monitor stool count RESOLVED   No fever, leukocytosis. Abdominal exam w/o TTP, distention, peritoneal signs. cannot rule out infectious diarrhea (acute) given frequency of watery stools  discontinued GI PCR given no stool to collect  Plan  - Monitor stool count

## 2025-01-24 NOTE — PROGRESS NOTE ADULT - PROBLEM SELECTOR PLAN 8
home: Carvedilol 6.25mg BID   - patient does not take entresto anymore since 09/24  - Hold BP meds before each dialysis session, can resume after HD home: Carvedilol 6.25mg BID .  - patient does not take entresto anymore since 09/24  - Hold BP meds before each dialysis session, can resume after HD

## 2025-01-24 NOTE — PROGRESS NOTE ADULT - ASSESSMENT
80 y/o M  w/ PMHx CAD s/p CABG x4 (2007), PCI to OM1 2/22, HFrEF (12/23 EF 30%), PAD s/p multiple peripheral interventions with L pop aneurysm repair c/b compartment syndrome with fasciotomy and subsequent LLE bypass (3/19), RLE bypass (4/20), undiagnosed clotting disorder? on eliquis, HTN, DM, BPH, ESRD on HD TTS via right chest TDC, missed dialysis x2 due to body aches, last dialyzed Saturday 1/18/25. Admitted for urgent dialysis.

## 2025-01-24 NOTE — H&P ADULT - NSHPLABSRESULTS_GEN_ALL_CORE
.  LABS:                         7.6    10.17 )-----------( 251      ( 23 Jan 2025 17:15 )             25.6     01-23    134[L]  |  99  |  97[H]  ----------------------------<  111[H]  5.8[H]   |  16[L]  |  10.23[H]    Ca    8.7      23 Jan 2025 17:15        Urinalysis Basic - ( 23 Jan 2025 17:15 )    Color: x / Appearance: x / SG: x / pH: x  Gluc: 111 mg/dL / Ketone: x  / Bili: x / Urobili: x   Blood: x / Protein: x / Nitrite: x   Leuk Esterase: x / RBC: x / WBC x   Sq Epi: x / Non Sq Epi: x / Bacteria: x            Lactate, Blood: 0.9 mmol/L (01-23 @ 17:15)      RADIOLOGY, EKG & ADDITIONAL TESTS: Reviewed.

## 2025-01-24 NOTE — PROGRESS NOTE ADULT - PROBLEM SELECTOR PLAN 7
Eliquis for the patient was dc'd in september per collateral from cardiologists office, it is unclear what clotting dx he has had, but has had PE's before per cardiologist, unclear if provoked or non-provoked since this was not in chart note read by cardiologist office nurse on collateral found today 1/24    Plan  - hold all AC given concern of melena Eliquis for the patient was dc'd in september per collateral from cardiologists office, it is unclear what clotting dx he has had, but has had PE's before per cardiologist, unclear if provoked or non-provoked since this was not in chart note read by cardiologist office nurse on collateral found today 1/24    Plan  - hold all AC given concern of ?alexsandra

## 2025-01-24 NOTE — PROGRESS NOTE ADULT - SUBJECTIVE AND OBJECTIVE BOX
NEPHROLOGY PROGRESS NOTE:    Interval history:  Patient seen and examined on HD. States he feels fine, tolerating well treatment      Vitals:  T(F): 98.2 (25 @ 10:02), Max: 98.6 (25 @ 17:30)  HR: 88 (25 @ 11:45)  BP: 156/90 (25 @ 11:45)  RR: 18 (25 @ 11:45)  SpO2: 99% (25 @ 11:45)  Wt(kg): --     @ 07:  -   @ 07:00  --------------------------------------------------------  IN: 0 mL / OUT: 2000 mL / NET: -2000 mL     @ 07:  -   @ 12:32  --------------------------------------------------------  IN: 1400 mL / OUT: 3100 mL / NET: -1700 mL      Height (cm): 373.4 ( @ 16:25)  Weight (kg): 78.9 ( @ 16:25)  BMI (kg/m2): 5.7 ( @ 16:25)  BSA (m2): 3.37 ( @ 16:25)    PE:  General: Not in acute distress, well-nourished  Neck: No visible mass, No JVD noted  Chest: CTAP b/l, no use of accessory respiratory muscles  Heart: RRR, S1/S2 wnl, no MRG  Abdomen: Soft, nontender, nondistended,  no hepatosplenomegaly  Extremities: No clubbing, cyanosis or edema  Neuro:  Alert, no apparent focal deficits, answer questions appropriately  Access: R chest TDC c/d/i    Pertinent labs & Imagin-24    135  |  97  |  44[H]  ----------------------------<  85  3.7   |  24  |  6.15[H]    Ca    8.5      2025 07:14  Phos  5.5       Mg     1.8         TPro  6.8  /  Alb  3.6  /  TBili  0.4  /  DBili      /  AST  31  /  ALT  52[H]  /  AlkPhos  104                            6.7    6.71  )-----------( 208      ( 2025 07:14 )             21.4       Urine Studies:  Urinalysis Basic - ( 2025 07:14 )    Color:  / Appearance:  / SG:  / pH:   Gluc: 85 mg/dL / Ketone:   / Bili:  / Urobili:    Blood:  / Protein:  / Nitrite:    Leuk Esterase:  / RBC:  / WBC    Sq Epi:  / Non Sq Epi:  / Bacteria:             MEDICATIONS  (STANDING):  atorvastatin 80 milliGRAM(s) Oral at bedtime  carvedilol 6.25 milliGRAM(s) Oral every 12 hours  chlorhexidine 2% Cloths 1 Application(s) Topical daily  melatonin 5 milliGRAM(s) Oral at bedtime  pantoprazole  Injectable 40 milliGRAM(s) IV Push every 12 hours      Hemodialysis Treatment.:     Schedule: Once, Modality: Hemodialysis, Access: Internal Jugular Central Venous Catheter    Dialyzer: Optiflux [id number], Time: 180 Min    Blood Flow: 400 mL/Min , Dialysate Flow: 500 mL/Min, Dialysate Temp: 36.5, Tubinmm (Adult)    Target Fluid Removal: 2 Liters    Dialysate Electrolytes (mEq/L): Potassium 2, Calcium 2.5, Sodium 138, Bicarbonate 35 (25 @ 06:49) [Completed]  Hemodialysis Treatment.:     Schedule: Once, Modality: Hemodialysis, Access: Internal Jugular Central Venous Catheter    Dialyzer: Optiflux [id number], Time: 180 Min    Blood Flow: 400 mL/Min , Dialysate Flow: 500 mL/Min, Dialysate Temp: 36.5, Tubinmm (Adult)    Target Fluid Removal: 3 Liters    Dialysate Electrolytes (mEq/L): Potassium 2, Calcium 2.5, Sodium 138, Bicarbonate 35 (25 @ 18:35) [Completed]

## 2025-01-24 NOTE — PROGRESS NOTE ADULT - PROBLEM SELECTOR PLAN 5
#CAD s/p CABG x4 (2007), PCI to OM1 2/22. Home med: aspirin, atorvastatin 80mg, ezetimibe.  - c/w home atorvastatin 80mg  - patient med red updated - patient not on any anticoagulation #CAD s/p CABG x4 (2007), PCI to OM1 2/22. Home med: aspirin, atorvastatin 80mg, ezetimibe.    - patient med red updated - patient not on any anticoagulation  - c/w home atorvastatin 80mg  - restart home aspirin

## 2025-01-24 NOTE — PROGRESS NOTE ADULT - SUBJECTIVE AND OBJECTIVE BOX
** Transfer from [facility] to [facility] **    80 y/o M  w/ PMHx CAD s/p CABG x4 (2007), PCI to OM1 2/22, HFpEF (9/24 40%, follows outpatient cardiologist Dr. Pierre Whitfield at [facility], was previously on GDMT but entresto and jardiance stopped in sept 2024) PAD s/p multiple peripheral interventions with L pop aneurysm repair c/b compartment syndrome with fasciotomy and subsequent LLE bypass (3/19), RLE bypass (4/20), undiagnosed clotting disorder leading to PE, was previously on Eliquis and aspirin but dc since sept 24' by cardiologist, HTN, DM, BPH, ESRD (on HD TTS via right chest TDC), missed dialysis x2 (Sat and Tues 1/23) due to body aches, malaise, and "feeling sick" in addition to having diarrhea x4 watery bowel movements, and dark stools. He was last dialyzed outpatient Saturday 1/18/25. Admitted for urgent dialysis, received this on 1/23 with 2L removed via UF. Rapid RVP negative. His CBC on 1/24 AM was 6.7, was ordered 1 packed unit of RBC with post transfusion CBC of 8.4 at 1400 on 1/24. Patient endorsing diarrhea in outpatient setting but no stool sample collected for GI PCR. Due to the positive FOBT and drop in CBC despite having HD that day, GI bleed cannot be ruled out, and GI was consulted. Iron studies and hemolysis labs ordered for 1/25 AM. Patient is DNR/DNI (trial NIV) as per MOST 1/24/2025.    INTERVAL/OVERNIGHT EVENTS: None    SUBJECTIVE:  Patient seen and examined at bedside, comfortable, NAD. Denied fever, chest pain, dyspnea, abdominal pain.     Vital Signs Last 12 Hrs  T(F): 99.8 (01-24-25 @ 16:27), Max: 99.8 (01-24-25 @ 16:27)  HR: 98 (01-24-25 @ 17:05) (88 - 100)  BP: 173/84 (01-24-25 @ 17:05) (140/72 - 185/80)  BP(mean): 119 (01-24-25 @ 17:05) (113 - 119)  RR: 18 (01-24-25 @ 17:05) (18 - 18)  SpO2: 94% (01-24-25 @ 17:05) (94% - 99%)  I&O's Summary    23 Jan 2025 07:01  -  24 Jan 2025 07:00  --------------------------------------------------------  IN: 0 mL / OUT: 2000 mL / NET: -2000 mL    24 Jan 2025 07:01  -  24 Jan 2025 20:47  --------------------------------------------------------  IN: 1600 mL / OUT: 3100 mL / NET: -1500 mL        PHYSICAL EXAM:  General: NAD  HEENT: PERRL, EOM intact, sclera anicteric, MMM  Cardiovascular: RRR; no MRG;   Respiratory: CTAB; no WRR  GI/: soft; NTND; BS x4  Extremities: WWP; 2+ peripheral pulses bilaterally; no LE edema  Skin: normal color & turgor; no rash  Neurologic: aox3; no focal deficits    LABS:                        8.4    8.02  )-----------( 218      ( 24 Jan 2025 15:04 )             28.0     01-24    135  |  97  |  44[H]  ----------------------------<  85  3.7   |  24  |  6.15[H]    Ca    8.5      24 Jan 2025 07:14  Phos  5.5     01-24  Mg     1.8     01-24    TPro  6.8  /  Alb  3.6  /  TBili  0.4  /  DBili  0.1  /  AST  31  /  ALT  52[H]  /  AlkPhos  104  01-24      Urinalysis Basic - ( 24 Jan 2025 07:14 )    Color: x / Appearance: x / SG: x / pH: x  Gluc: 85 mg/dL / Ketone: x  / Bili: x / Urobili: x   Blood: x / Protein: x / Nitrite: x   Leuk Esterase: x / RBC: x / WBC x   Sq Epi: x / Non Sq Epi: x / Bacteria: x          RADIOLOGY & ADDITIONAL TESTS:    MEDICATIONS  (STANDING):  atorvastatin 80 milliGRAM(s) Oral at bedtime  carvedilol 6.25 milliGRAM(s) Oral every 12 hours  chlorhexidine 2% Cloths 1 Application(s) Topical daily  ezetimibe 10 milliGRAM(s) Oral every 24 hours  isosorbide   mononitrate ER Tablet (IMDUR) 30 milliGRAM(s) Oral daily  melatonin 5 milliGRAM(s) Oral at bedtime  Nephro-briseyda 1 Tablet(s) Oral every 24 hours  pantoprazole  Injectable 40 milliGRAM(s) IV Push every 12 hours    MEDICATIONS  (PRN):  acetaminophen     Tablet .. 650 milliGRAM(s) Oral every 6 hours PRN Temp greater or equal to 38C (100.4F), Mild Pain (1 - 3)  benzonatate 100 milliGRAM(s) Oral every 8 hours PRN Cough   ** Transfer acceptance from [facility] to [facility] **    78 y/o M  w/ PMHx CAD s/p CABG x4 (2007), PCI to OM1 2/22, HFpEF (9/24 40%, follows outpatient cardiologist Dr. Pierre Whitfield at [facility], was previously on GDMT but entresto and jardiance stopped in sept 2024) PAD s/p multiple peripheral interventions with L pop aneurysm repair c/b compartment syndrome with fasciotomy and subsequent LLE bypass (3/19), RLE bypass (4/20), undiagnosed clotting disorder leading to PE, was previously on Eliquis and aspirin but dc since sept 24' by cardiologist, HTN, DM, BPH, ESRD (on HD TTS via right chest TDC), missed dialysis x2 (Sat and Tues 1/23) due to body aches, malaise, and "feeling sick" in addition to having diarrhea x4 watery bowel movements, and dark stools. He was last dialyzed outpatient Saturday 1/18/25. Admitted for urgent dialysis, received this on 1/23 with 2L removed via UF. Rapid RVP negative. His CBC on 1/24 AM was 6.7, was ordered 1 packed unit of RBC with post transfusion CBC of 8.4 at 1400 on 1/24. Patient endorsing diarrhea in outpatient setting but no stool sample collected for GI PCR. Due to the positive FOBT and drop in CBC despite having HD that day, GI bleed cannot be ruled out, and GI was consulted. Iron studies and hemolysis labs ordered for 1/25 AM. Patient is DNR/DNI (trial NIV) as per MOST 1/24/2025.    INTERVAL/OVERNIGHT EVENTS: None    SUBJECTIVE:  Patient seen and examined at bedside, comfortable, NAD. Denied fever, chest pain, dyspnea, abdominal pain.     Vital Signs Last 12 Hrs  T(F): 99.8 (01-24-25 @ 16:27), Max: 99.8 (01-24-25 @ 16:27)  HR: 98 (01-24-25 @ 17:05) (88 - 100)  BP: 173/84 (01-24-25 @ 17:05) (140/72 - 185/80)  BP(mean): 119 (01-24-25 @ 17:05) (113 - 119)  RR: 18 (01-24-25 @ 17:05) (18 - 18)  SpO2: 94% (01-24-25 @ 17:05) (94% - 99%)  I&O's Summary    23 Jan 2025 07:01  -  24 Jan 2025 07:00  --------------------------------------------------------  IN: 0 mL / OUT: 2000 mL / NET: -2000 mL    24 Jan 2025 07:01  -  24 Jan 2025 20:47  --------------------------------------------------------  IN: 1600 mL / OUT: 3100 mL / NET: -1500 mL        PHYSICAL EXAM:  General: NAD  HEENT: PERRL, EOM intact, sclera anicteric, MMM  Cardiovascular: RRR; no MRG;   Respiratory: CTAB; no WRR  GI/: soft; NTND; BS x4  Extremities: WWP; 2+ peripheral pulses bilaterally; no LE edema  Skin: normal color & turgor; no rash  Neurologic: aox3; no focal deficits    LABS:                        8.4    8.02  )-----------( 218      ( 24 Jan 2025 15:04 )             28.0     01-24    135  |  97  |  44[H]  ----------------------------<  85  3.7   |  24  |  6.15[H]    Ca    8.5      24 Jan 2025 07:14  Phos  5.5     01-24  Mg     1.8     01-24    TPro  6.8  /  Alb  3.6  /  TBili  0.4  /  DBili  0.1  /  AST  31  /  ALT  52[H]  /  AlkPhos  104  01-24      Urinalysis Basic - ( 24 Jan 2025 07:14 )    Color: x / Appearance: x / SG: x / pH: x  Gluc: 85 mg/dL / Ketone: x  / Bili: x / Urobili: x   Blood: x / Protein: x / Nitrite: x   Leuk Esterase: x / RBC: x / WBC x   Sq Epi: x / Non Sq Epi: x / Bacteria: x          RADIOLOGY & ADDITIONAL TESTS:    MEDICATIONS  (STANDING):  atorvastatin 80 milliGRAM(s) Oral at bedtime  carvedilol 6.25 milliGRAM(s) Oral every 12 hours  chlorhexidine 2% Cloths 1 Application(s) Topical daily  ezetimibe 10 milliGRAM(s) Oral every 24 hours  isosorbide   mononitrate ER Tablet (IMDUR) 30 milliGRAM(s) Oral daily  melatonin 5 milliGRAM(s) Oral at bedtime  Nephro-briseyda 1 Tablet(s) Oral every 24 hours  pantoprazole  Injectable 40 milliGRAM(s) IV Push every 12 hours    MEDICATIONS  (PRN):  acetaminophen     Tablet .. 650 milliGRAM(s) Oral every 6 hours PRN Temp greater or equal to 38C (100.4F), Mild Pain (1 - 3)  benzonatate 100 milliGRAM(s) Oral every 8 hours PRN Cough   ** Transfer acceptance from [facility] to [facility] **    80 y/o M  w/ PMHx CAD s/p CABG x4 (2007), PCI to OM1 2/22, HFpEF (9/24 40%, follows outpatient cardiologist Dr. Pierre Whitfield at [facility], was previously on GDMT but entresto and jardiance stopped in sept 2024) PAD s/p multiple peripheral interventions with L pop aneurysm repair c/b compartment syndrome with fasciotomy and subsequent LLE bypass (3/19), RLE bypass (4/20), undiagnosed clotting disorder leading to PE, was previously on Eliquis and aspirin but dc since sept 24' by cardiologist, HTN, DM, BPH, ESRD (on HD TTS via right chest TDC), missed dialysis x2 (Sat and Tues 1/23) due to body aches, malaise, and "feeling sick" in addition to having diarrhea x4 watery bowel movements, and dark stools. He was last dialyzed outpatient Saturday 1/18/25. Admitted for urgent dialysis, received this on 1/23 with 2L removed via UF. Rapid RVP negative. His CBC on 1/24 AM was 6.7, was ordered 1 packed unit of RBC with post transfusion CBC of 8.4 at 1400 on 1/24. Patient endorsing diarrhea in outpatient setting but no stool sample collected for GI PCR. Due to the positive FOBT and drop in CBC despite having HD that day, GI bleed cannot be ruled out, and GI was consulted. Iron studies and hemolysis labs ordered for 1/25 AM. Patient is DNR/DNI (trial NIV) as per MOST 1/24/2025.    INTERVAL/OVERNIGHT EVENTS: None    SUBJECTIVE:  Patient seen and examined at bedside, comfortable, NAD. Denied fever, chest pain, dyspnea, abdominal pain.     Vital Signs Last 12 Hrs  T(F): 99.8 (01-24-25 @ 16:27), Max: 99.8 (01-24-25 @ 16:27)  HR: 98 (01-24-25 @ 17:05) (88 - 100)  BP: 173/84 (01-24-25 @ 17:05) (140/72 - 185/80)  BP(mean): 119 (01-24-25 @ 17:05) (113 - 119)  RR: 18 (01-24-25 @ 17:05) (18 - 18)  SpO2: 94% (01-24-25 @ 17:05) (94% - 99%)  I&O's Summary    23 Jan 2025 07:01  -  24 Jan 2025 07:00  --------------------------------------------------------  IN: 0 mL / OUT: 2000 mL / NET: -2000 mL    24 Jan 2025 07:01  -  24 Jan 2025 20:47  --------------------------------------------------------  IN: 1600 mL / OUT: 3100 mL / NET: -1500 mL        PHYSICAL EXAM:  General: NAD  HEENT: PERRL, EOM intact, sclera anicteric, MMM  Cardiovascular: RRR; no MRG;   Respiratory: CTAB; no WRR  GI/: soft; NTND; BS x4  Extremities: WWP; 2+ peripheral pulses bilaterally; no LE edema  Skin: normal color & turgor; no rash  Neurologic: aox3; no focal deficits    LABS:                        8.4    8.02  )-----------( 218      ( 24 Jan 2025 15:04 )             28.0     01-24    135  |  97  |  44[H]  ----------------------------<  85  3.7   |  24  |  6.15[H]    Ca    8.5      24 Jan 2025 07:14  Phos  5.5     01-24  Mg     1.8     01-24    TPro  6.8  /  Alb  3.6  /  TBili  0.4  /  DBili  0.1  /  AST  31  /  ALT  52[H]  /  AlkPhos  104  01-24      Urinalysis Basic - ( 24 Jan 2025 07:14 )    Color: x / Appearance: x / SG: x / pH: x  Gluc: 85 mg/dL / Ketone: x  / Bili: x / Urobili: x   Blood: x / Protein: x / Nitrite: x   Leuk Esterase: x / RBC: x / WBC x   Sq Epi: x / Non Sq Epi: x / Bacteria: x          RADIOLOGY & ADDITIONAL TESTS:    MEDICATIONS  (STANDING):  atorvastatin 80 milliGRAM(s) Oral at bedtime  carvedilol 6.25 milliGRAM(s) Oral every 12 hours  chlorhexidine 2% Cloths 1 Application(s) Topical daily  ezetimibe 10 milliGRAM(s) Oral every 24 hours  isosorbide   mononitrate ER Tablet (IMDUR) 30 milliGRAM(s) Oral daily  melatonin 5 milliGRAM(s) Oral at bedtime  Nephro-briseyda 1 Tablet(s) Oral every 24 hours  pantoprazole  Injectable 40 milliGRAM(s) IV Push every 12 hours    MEDICATIONS  (PRN):  acetaminophen     Tablet .. 650 milliGRAM(s) Oral every 6 hours PRN Temp greater or equal to 38C (100.4F), Mild Pain (1 - 3)  benzonatate 100 milliGRAM(s) Oral every 8 hours PRN Cough

## 2025-01-24 NOTE — PROGRESS NOTE ADULT - ASSESSMENT
80 y/o M  w/ ESRD on HD TTS via right chest TDC, PMHx CAD s/p CABG x4 (2007), PCI to OM1 2/22, HFrEF (12/23 EF 30%), missed dialysis x2 due to cold weather, admitted for volume overload state due to missed dialysis, nephrology consulted for HD management.    Tolerated well HD w/ 2k removed on 1/23  ESRD for HD w/ below parameters for volume and solute clearance  -F180 T 180  UF 2L K 2    -Renal diet  -Daily standing weight  -For repeat dialysis am    HTN/Volume  -Fluid restriction 1 L /d  -Strict I/o chart  -Hold BP meds before each dialysis sessions , can resume after dialysis when indicated    Anemia/CKD: Hb not at goal 7.6  Retacrit injections w/ HD   -T sat  26% , please check ferritin level    CKD/MBD: Ca 8.5 PO4 5.5  Cont home binders

## 2025-01-24 NOTE — PROGRESS NOTE ADULT - CONVERSATION DETAILS
patient does not want intubation, but non invasive measured of resp support is OK, does not want  CPR, artifical nutrition not discussed. He gave me the example of "if I need surgery and I can't breathe on my own, then "let me go" He will be continuing on dialysis for his ESRD

## 2025-01-25 RX ORDER — ACETAMINOPHEN, DIPHENHYDRAMINE HCL, PHENYLEPHRINE HCL 325; 25; 5 MG/1; MG/1; MG/1
3 TABLET ORAL AT BEDTIME
Refills: 0 | Status: DISCONTINUED | OUTPATIENT
Start: 2025-01-25 | End: 2025-01-25

## 2025-01-25 NOTE — CHART NOTE - NSCHARTNOTEFT_GEN_A_CORE
The patient wishes to leave against medical advice. He states he has not been able to sleep for the past few nights, his roommate today has been agitated and is not letting him sleep. He would rather go home and sleep in peace, he states he will go to his own dialysis center for further dialysis. Confirmed all IVs were removed. I have discussed the risks, benefits and alternatives (including the possibility of worsening of disease, pain, permanent disability, and/or death) with the patient and his/her family (if available).  The patient voices understanding of these risks, benefits, and alternatives and still wishes to sign out against medical advice. The patient is awake, alert, oriented  x 3 and has demonstrated capacity to refuse care. I have advised the patient that they can and should return immediately should they develop any worse/different/additional symptoms, or if they change their mind and want to continue their care.

## 2025-02-03 DIAGNOSIS — D63.1 ANEMIA IN CHRONIC KIDNEY DISEASE: ICD-10-CM

## 2025-02-03 DIAGNOSIS — Z79.01 LONG TERM (CURRENT) USE OF ANTICOAGULANTS: ICD-10-CM

## 2025-02-03 DIAGNOSIS — E11.51 TYPE 2 DIABETES MELLITUS WITH DIABETIC PERIPHERAL ANGIOPATHY WITHOUT GANGRENE: ICD-10-CM

## 2025-02-03 DIAGNOSIS — I50.32 CHRONIC DIASTOLIC (CONGESTIVE) HEART FAILURE: ICD-10-CM

## 2025-02-03 DIAGNOSIS — Z99.2 DEPENDENCE ON RENAL DIALYSIS: ICD-10-CM

## 2025-02-03 DIAGNOSIS — Z53.29 PROCEDURE AND TREATMENT NOT CARRIED OUT BECAUSE OF PATIENT'S DECISION FOR OTHER REASONS: ICD-10-CM

## 2025-02-03 DIAGNOSIS — Z95.5 PRESENCE OF CORONARY ANGIOPLASTY IMPLANT AND GRAFT: ICD-10-CM

## 2025-02-03 DIAGNOSIS — F17.210 NICOTINE DEPENDENCE, CIGARETTES, UNCOMPLICATED: ICD-10-CM

## 2025-02-03 DIAGNOSIS — E11.22 TYPE 2 DIABETES MELLITUS WITH DIABETIC CHRONIC KIDNEY DISEASE: ICD-10-CM

## 2025-02-03 DIAGNOSIS — R19.7 DIARRHEA, UNSPECIFIED: ICD-10-CM

## 2025-02-03 DIAGNOSIS — I73.9 PERIPHERAL VASCULAR DISEASE, UNSPECIFIED: ICD-10-CM

## 2025-02-03 DIAGNOSIS — Z79.84 LONG TERM (CURRENT) USE OF ORAL HYPOGLYCEMIC DRUGS: ICD-10-CM

## 2025-02-03 DIAGNOSIS — Z66 DO NOT RESUSCITATE: ICD-10-CM

## 2025-02-03 DIAGNOSIS — Z79.82 LONG TERM (CURRENT) USE OF ASPIRIN: ICD-10-CM

## 2025-02-03 DIAGNOSIS — Z95.1 PRESENCE OF AORTOCORONARY BYPASS GRAFT: ICD-10-CM

## 2025-02-03 DIAGNOSIS — I25.10 ATHEROSCLEROTIC HEART DISEASE OF NATIVE CORONARY ARTERY WITHOUT ANGINA PECTORIS: ICD-10-CM

## 2025-02-03 DIAGNOSIS — N18.6 END STAGE RENAL DISEASE: ICD-10-CM

## 2025-02-03 DIAGNOSIS — N40.0 BENIGN PROSTATIC HYPERPLASIA WITHOUT LOWER URINARY TRACT SYMPTOMS: ICD-10-CM

## 2025-02-03 DIAGNOSIS — E87.5 HYPERKALEMIA: ICD-10-CM

## 2025-02-03 DIAGNOSIS — I13.2 HYPERTENSIVE HEART AND CHRONIC KIDNEY DISEASE WITH HEART FAILURE AND WITH STAGE 5 CHRONIC KIDNEY DISEASE, OR END STAGE RENAL DISEASE: ICD-10-CM

## 2025-02-03 DIAGNOSIS — I45.10 UNSPECIFIED RIGHT BUNDLE-BRANCH BLOCK: ICD-10-CM
